# Patient Record
Sex: MALE | Race: WHITE | NOT HISPANIC OR LATINO
[De-identification: names, ages, dates, MRNs, and addresses within clinical notes are randomized per-mention and may not be internally consistent; named-entity substitution may affect disease eponyms.]

---

## 2018-06-21 ENCOUNTER — APPOINTMENT (OUTPATIENT)
Dept: HEART AND VASCULAR | Facility: CLINIC | Age: 63
End: 2018-06-21
Payer: COMMERCIAL

## 2018-06-21 VITALS
BODY MASS INDEX: 27.36 KG/M2 | DIASTOLIC BLOOD PRESSURE: 82 MMHG | HEART RATE: 57 BPM | WEIGHT: 202 LBS | SYSTOLIC BLOOD PRESSURE: 122 MMHG | HEIGHT: 72 IN

## 2018-06-21 DIAGNOSIS — J30.2 OTHER SEASONAL ALLERGIC RHINITIS: ICD-10-CM

## 2018-06-21 DIAGNOSIS — R42 DIZZINESS AND GIDDINESS: ICD-10-CM

## 2018-06-21 DIAGNOSIS — G43.909 MIGRAINE, UNSPECIFIED, NOT INTRACTABLE, W/OUT STATUS MIGRAINOSUS: ICD-10-CM

## 2018-06-21 DIAGNOSIS — K21.9 GASTRO-ESOPHAGEAL REFLUX DISEASE W/OUT ESOPHAGITIS: ICD-10-CM

## 2018-06-21 DIAGNOSIS — R94.31 ABNORMAL ELECTROCARDIOGRAM [ECG] [EKG]: ICD-10-CM

## 2018-06-21 PROCEDURE — 93000 ELECTROCARDIOGRAM COMPLETE: CPT

## 2018-06-21 PROCEDURE — 99205 OFFICE O/P NEW HI 60 MIN: CPT | Mod: 25

## 2018-06-21 PROCEDURE — 36415 COLL VENOUS BLD VENIPUNCTURE: CPT

## 2018-06-25 LAB
24R-OH-CALCIDIOL SERPL-MCNC: 53.1 PG/ML
25(OH)D3 SERPL-MCNC: 35.1 NG/ML
ALBUMIN SERPL ELPH-MCNC: 4.9 G/DL
ALP BLD-CCNC: 59 U/L
ALT SERPL-CCNC: 20 U/L
ANION GAP SERPL CALC-SCNC: 14 MMOL/L
APPEARANCE: CLEAR
AST SERPL-CCNC: 25 U/L
BACTERIA: NEGATIVE
BASOPHILS # BLD AUTO: 0.02 K/UL
BASOPHILS NFR BLD AUTO: 0.5 %
BILIRUB SERPL-MCNC: 0.6 MG/DL
BILIRUBIN URINE: ABNORMAL
BLOOD URINE: NEGATIVE
BUN SERPL-MCNC: 24 MG/DL
CALCIUM SERPL-MCNC: 9.5 MG/DL
CHLORIDE SERPL-SCNC: 110 MMOL/L
CHOLEST SERPL-MCNC: 183 MG/DL
CHOLEST/HDLC SERPL: 2.8 RATIO
CO2 SERPL-SCNC: 23 MMOL/L
COLOR: ABNORMAL
CREAT SERPL-MCNC: 1.41 MG/DL
CREAT SPEC-SCNC: 285 MG/DL
EOSINOPHIL # BLD AUTO: 0.07 K/UL
EOSINOPHIL NFR BLD AUTO: 1.7 %
FERRITIN SERPL-MCNC: 39 NG/ML
FOLATE SERPL-MCNC: 16.3 NG/ML
GLUCOSE QUALITATIVE U: NEGATIVE MG/DL
GLUCOSE SERPL-MCNC: 95 MG/DL
HBA1C MFR BLD HPLC: 5.1 %
HCT VFR BLD CALC: 44.3 %
HDLC SERPL-MCNC: 65 MG/DL
HGB BLD-MCNC: 14.9 G/DL
HYALINE CASTS: 1 /LPF
IMM GRANULOCYTES NFR BLD AUTO: 0 %
IRON SATN MFR SERPL: 27 %
IRON SERPL-MCNC: 106 UG/DL
KETONES URINE: ABNORMAL
LDLC SERPL CALC-MCNC: 105 MG/DL
LEUKOCYTE ESTERASE URINE: NEGATIVE
LYMPHOCYTES # BLD AUTO: 0.93 K/UL
LYMPHOCYTES NFR BLD AUTO: 22.7 %
MAGNESIUM SERPL-MCNC: 2.1 MG/DL
MAN DIFF?: NORMAL
MCHC RBC-ENTMCNC: 31.4 PG
MCHC RBC-ENTMCNC: 33.6 GM/DL
MCV RBC AUTO: 93.3 FL
MICROALBUMIN 24H UR DL<=1MG/L-MCNC: <0.3 MG/DL
MICROALBUMIN/CREAT 24H UR-RTO: NORMAL
MICROSCOPIC-UA: NORMAL
MONOCYTES # BLD AUTO: 0.48 K/UL
MONOCYTES NFR BLD AUTO: 11.7 %
NEUTROPHILS # BLD AUTO: 2.6 K/UL
NEUTROPHILS NFR BLD AUTO: 63.4 %
NITRITE URINE: NEGATIVE
PH URINE: 6.5
PLATELET # BLD AUTO: 187 K/UL
POTASSIUM SERPL-SCNC: 4.6 MMOL/L
PROT SERPL-MCNC: 7.1 G/DL
PROTEIN URINE: 30 MG/DL
RBC # BLD: 4.75 M/UL
RBC # FLD: 13.5 %
RED BLOOD CELLS URINE: 2 /HPF
SODIUM SERPL-SCNC: 147 MMOL/L
SPECIFIC GRAVITY URINE: 1.04
SQUAMOUS EPITHELIAL CELLS: 0 /HPF
T3FREE SERPL-MCNC: 3.2 PG/ML
T4 FREE SERPL-MCNC: 1.7 NG/DL
TIBC SERPL-MCNC: 399 UG/DL
TRIGL SERPL-MCNC: 63 MG/DL
TSH SERPL-ACNC: 1.77 UIU/ML
UIBC SERPL-MCNC: 293 UG/DL
UROBILINOGEN URINE: 1 MG/DL
VIT B12 SERPL-MCNC: 569 PG/ML
WBC # FLD AUTO: 4.1 K/UL
WHITE BLOOD CELLS URINE: 0 /HPF

## 2018-06-25 RX ORDER — SIMVASTATIN 80 MG/1
TABLET, FILM COATED ORAL
Refills: 0 | Status: DISCONTINUED | COMMUNITY
End: 2018-06-25

## 2018-07-26 ENCOUNTER — OUTPATIENT (OUTPATIENT)
Dept: OUTPATIENT SERVICES | Facility: HOSPITAL | Age: 63
LOS: 1 days | End: 2018-07-26
Payer: COMMERCIAL

## 2018-07-26 ENCOUNTER — APPOINTMENT (OUTPATIENT)
Dept: ULTRASOUND IMAGING | Facility: HOSPITAL | Age: 63
End: 2018-07-26
Payer: COMMERCIAL

## 2018-07-26 PROCEDURE — 93016 CV STRESS TEST SUPVJ ONLY: CPT

## 2018-07-26 PROCEDURE — 93880 EXTRACRANIAL BILAT STUDY: CPT

## 2018-07-26 PROCEDURE — 93017 CV STRESS TEST TRACING ONLY: CPT

## 2018-07-26 PROCEDURE — 93880 EXTRACRANIAL BILAT STUDY: CPT | Mod: 26

## 2018-07-26 PROCEDURE — 93306 TTE W/DOPPLER COMPLETE: CPT | Mod: 26

## 2018-07-26 PROCEDURE — 93018 CV STRESS TEST I&R ONLY: CPT

## 2018-07-26 PROCEDURE — 93306 TTE W/DOPPLER COMPLETE: CPT

## 2018-08-09 ENCOUNTER — TRANSCRIPTION ENCOUNTER (OUTPATIENT)
Age: 63
End: 2018-08-09

## 2018-11-15 ENCOUNTER — TRANSCRIPTION ENCOUNTER (OUTPATIENT)
Age: 63
End: 2018-11-15

## 2018-11-15 RX ORDER — LOSARTAN POTASSIUM 100 MG/1
100 TABLET, FILM COATED ORAL
Refills: 0 | Status: DISCONTINUED | COMMUNITY
End: 2018-11-15

## 2018-12-17 ENCOUNTER — APPOINTMENT (OUTPATIENT)
Dept: HEART AND VASCULAR | Facility: CLINIC | Age: 63
End: 2018-12-17
Payer: COMMERCIAL

## 2018-12-17 VITALS
HEART RATE: 61 BPM | WEIGHT: 204 LBS | DIASTOLIC BLOOD PRESSURE: 80 MMHG | SYSTOLIC BLOOD PRESSURE: 122 MMHG | HEIGHT: 72 IN | BODY MASS INDEX: 27.63 KG/M2

## 2018-12-17 PROCEDURE — 99213 OFFICE O/P EST LOW 20 MIN: CPT

## 2018-12-17 NOTE — HISTORY OF PRESENT ILLNESS
[FreeTextEntry1] : 63 y/o male with h/o hypothyroid, htn, hl, migraine, carotid disease who presents for f/up today.\par \par Last seen \par \par swtiched to lisinopril from losartan due to recall in \par switched to lipitor from zocor \par No cp, sob, syncope, edema, palpitation, orthopnea, pnd, lh\par \par After last visit had\par \par : ETT and Echo which were normal\par \par carotid u/s : mild calcific plaque right carotid bifurcation/right prox ICA\par \par Noted to have mild carotid disease 8 years ago\par \par \par On statin since 40's\par \par \par \par Exercises with ellipitical 2 hours - 4-5 times/week\par Eats healthy\par Low stress\par 7 hours sleep\par No mental health issues\par \par Food allergies to milk/soy\par \par \par \par PMH/PSH:\par hypothyroid\par seasonal allergies\par gerd\par htn\par hl\par brissa\par migraine\par alfred neuroma surgery right foot \par uvelectomy for frank \par \par \par \par \par ALL:\par ppi - rash\par \par \par \par SH:\par no tobacco\par 1 glass wine 3-4 times/week\par no drugs\par works as artist/real estate\par from [place]\par , re-\par daughter - 39, Marfan's\par \par \par \par \par MEDS:\par synthroid 75 mcg qd\par fluticasone 50 mcg per nostril\par azelastine 137 mcg 2 sprays/nostril bid\par loratidine 10 mg qd\par ranitidine 150 mg qd\par buproprion 100 mg qd\par lisinopril 20 mg qd\par lamotrigine 100 mg bid\par D3 2000iu qd\par omega 3 1200 bid\par coQ 10 20 mg qd\par asa 81 mg qd\par biotin 5000 mcg qd\par b12 1000 mcg qod\par ferrous sulfate 65 mg qod\par Vit C 500 mg qod\par dhea 25 mg qod\par mg 500 mg \par lipitor 40 mg qhs\par \par \par \par FH:\par mother - cabg 80's,  91\par father - cabg 70's,  93\par brother - 68, alive\par sister - 70, alive, RA, deafness\par EXAM:  ECHOCARDIOGRAM (CARDIOL)                      \par \par PROCEDURE DATE:  2018  \par \par \par \par INTERPRETATION:  Patient Height: 182.9 cm\par Patient Weight: 91.6 kg\par Heart Rate: 54 bpm\par Systolic Pressure: 143 mmHg\par Diastolic Pressure: 72 mmHg\par BSA: 2.1 m^2\par Interpretation Summary\par There is mild concentric left ventricular hypertrophy.The left \par ventricular wall\par  motion is normal.The left ventricular ejection fraction is estimated to \par be\par  55-60%The left atrial size is normal.Right atrial size is normal.The \par right\par  ventricle is normal in size and function.Structurally normal aortic \par valve.No\par  aortic regurgitation noted.There is mild to moderate mitral annular\par  calcification.Mitral valve thickening noted.Structurally normal tricuspid\par  valve.No tricuspid regurgitation noted.No aortic root \par dilatation.Structurally\par  normal pulmonic valve.There is trace to mild pulmonic \par regurgitation.There is\par  no pericardial effusion.\par Procedure Details\par A complete two-dimensional transthoracic echocardiogram was performed (2D,\par M-mode, spectral and color flow doppler).\par Study Quality: Good.\par Left Ventricle\par There is mild concentric left ventricular hypertrophy.\par The left ventricular wall motion is normal.\par The left ventricular ejection fraction is normal.\par The left ventricular ejection fraction is estimated to be 55-60%\par Left Atrium\par The left atrial size is normal.\par Right Atrium\par Right atrial size is normal.\par Right Ventricle\par The right ventricle is normal in size and function.\par Aortic Valve\par Structurally normal aortic valve.\par No aortic regurgitation noted.\par Mitral Valve\par There is mild to moderate mitral annular calcification.\par Mitral valve thickening noted.\par No mitral regurgitation noted.\par Tricuspid Valve\par Structurally normal tricuspid valve.\par No tricuspid regurgitation noted.\par Pulmonic Valve\par Structurally normal pulmonic valve.\par There is trace to mild pulmonic regurgitation.\par Arteries and Venous System\par No aortic root dilatation.\par The inferior vena cava is normal in size (<2.1 cm) with normal inspiratory\par collapse (>50%) consistent with normal right atrial pressure.\par Pericardium / Pleura\par There is no pericardial effusion.\par Doppler Measurements & Calculations\par MV E point: 60.7 cm/sec\par MV A point: 57.8 cm/sec\par MV E/A: 1.1\par Ao V2 mean: 102.5 cm/sec\par Ao mean P.7 mmHg\par Ao mean PG (full): 1.5 mmHg\par Ao V2 VTI: 28.9 cm\par JALYN(I,A): 3.6 cm^2\par JALYN(I,D): 3.6 cm^2\par LV max P.3 mmHg\par LV mean PG: 3.2 mmHg\par LV V1 max: 125.9 cm/sec\par LV V1 mean: 85.1 cm/sec\par LV V1 VTI: 24.6 cm\par SV(Ao): 316.6 ml\par SI(Ao): 148.0 ml/m^2\par SV(LVOT): 104.2 ml\par SI(LVOT): 48.7 ml/m^2\par TR Max viky: 249.8 cm/sec\par MMode 2D Measurements & Calculations\par IVSd: 1.3 cm\par LVIDd: 4.7 cm\par LVIDs: 2.9 cm\par LVPWd: 1.2 cm\par IVS/LVPW: 1.1\par FS: 38.7 %\par EDV(Teich): 104.0 ml\par ESV(Teich): 32.2 ml\par LV mass(C)d: 220.1 grams\par LV mass(C)dI: 102.9 grams/m^2\par SI(cubed): 38.2 ml/m^2\par Ao root diam: 3.7 cm\par Ao root area: 11.0 cm^2\par LA dimension: 4.3 cm\par asc Aorta: 3.5 cm\par LA/Ao: 1.1\par LVOT diam: 2.3 cm\par LVOT area: 4.2 cm^2\par LVOT area (M): 4.2 cm^2\par LVLd ap4: 8.0 cm\par EDV(MOD-sp4): 61 ml\par LVLs ap4: 6.1 cm\par ESV(MOD-sp4): 27 ml\par EF(MOD-sp4): 55.7 %\par LVLd ap2: 7.3 cm\par EDV(MOD-sp2): 59 ml\par LVLs ap2: 6.3 cm\par ESV(MOD-sp2): 24 ml\par EF(MOD-sp2): 59.3 %\par SV(MOD-sp4): 34 ml\par SI(MOD-sp4): 15.9 ml/m^2\par SV(MOD-sp2): 35 ml\par SI(MOD-sp2): 16.4 ml/m^2\par Interpreting Physician:Judy Acosta MD electronically signed on \par 2018 13:17:46\par \par \par \par \par \par \par \par "Thank you for the opportunity to participate in the care of this \par patient."\par \par \par \par JUDY ACOSTA M.D., ATTENDING CARDIOLOGIST\par This document has been electronically signed. 2018  1:17PM\par   \par \par \par EXAM:  EKG STRESS TEST (CARDIOL)                      \par \par PROCEDURE DATE:  2018  \par \par \par \par INTERPRETATION:  EXERCISE STRESS TREADMILL TEST\par \par Indication: Cardiac Risk Assessment. \par \par Exercise Performance:\par The patient exercised for 12 min and 17 secs on a Con protocol achieved \par a peak workload of 13.90 METS. The patient's baseline heart rate was 67 \par bpm and the peak heart rate achieved was 153 bpm which represents 97% of \par the patient's maximal predicted heart rate. The patient's baseline blood \par pressure was 150/80 mm Hg and the peak blood pressure was 190/80 mm Hg. \par The double product was 29,070.\par \par EKG at rest demonstrated normal sinus rhythm with nonspecific ST-T \par changes. At peak exercise there were no significant ST?T?changes noted.  \par \par IMPRESSION: \par \par 1.  Normal exercise stress EKG test. \par 2.  Normal heart rate and blood pressure response.\par 3.         Excellent exercise tolerance. \par \par \par \par \par \par "Thank you for the opportunity to participate in the care of this \par patient."\par \par \par

## 2018-12-17 NOTE — DISCUSSION/SUMMARY
[Patient] : the patient [___ Month(s)] : [unfilled] month(s) [FreeTextEntry1] : \par 63 y/o male with h/o hypothyroid, htn, hl, migraine, carotid disease who presents for f/up today\par \par -ekg 6/18- sinus kb, normal intervals, lateral repolarization changes c/w possible ischemia\par -ETT: 7/18: normal\par -Echo 7/18: normal ef, no significant valvular disease\par -Carotid u/s:  6/18: mild calcific plaque right carotid bifurcation/right prox ICA\par -asa 81 mg qd\par -continue statin - increase to 80 mg qd\par -labs 11/18 reviewed\par -if increasing LH, can consider holter to evaluate bradycardia\par -counseled on CVD risk factors and advised he d/c buproprion if possible given htn s.effect\par -limit supplement use\par -f/up 2-3 months for lipids

## 2019-02-25 ENCOUNTER — APPOINTMENT (OUTPATIENT)
Dept: INTERNAL MEDICINE | Facility: CLINIC | Age: 64
End: 2019-02-25
Payer: MEDICAID

## 2019-02-25 VITALS
WEIGHT: 198 LBS | HEIGHT: 72 IN | DIASTOLIC BLOOD PRESSURE: 71 MMHG | SYSTOLIC BLOOD PRESSURE: 122 MMHG | BODY MASS INDEX: 26.82 KG/M2 | HEART RATE: 68 BPM | OXYGEN SATURATION: 98 % | TEMPERATURE: 98.1 F

## 2019-02-25 DIAGNOSIS — K63.5 POLYP OF COLON: ICD-10-CM

## 2019-02-25 DIAGNOSIS — G43.009 MIGRAINE W/OUT AURA, NOT INTRACTABLE, W/OUT STATUS MIGRAINOSUS: ICD-10-CM

## 2019-02-25 DIAGNOSIS — Z78.9 OTHER SPECIFIED HEALTH STATUS: ICD-10-CM

## 2019-02-25 PROCEDURE — 99386 PREV VISIT NEW AGE 40-64: CPT

## 2019-02-25 RX ORDER — FERROUS SULFATE 325(65) MG
TABLET ORAL
Refills: 0 | Status: DISCONTINUED | COMMUNITY
End: 2019-02-25

## 2019-02-25 RX ORDER — LAMOTRIGINE 100 MG/1
100 TABLET ORAL TWICE DAILY
Refills: 0 | Status: ACTIVE | COMMUNITY

## 2019-02-25 RX ORDER — CHLORHEXIDINE GLUCONATE 4 %
325 (65 FE) LIQUID (ML) TOPICAL DAILY
Qty: 90 | Refills: 3 | Status: ACTIVE | COMMUNITY

## 2019-02-25 RX ORDER — ASCORBIC ACID 500 MG
TABLET ORAL
Refills: 0 | Status: ACTIVE | COMMUNITY

## 2019-02-25 RX ORDER — LEVOTHYROXINE SODIUM 75 UG/1
75 TABLET ORAL DAILY
Qty: 90 | Refills: 3 | Status: ACTIVE | COMMUNITY

## 2019-02-25 RX ORDER — OMEGA-3/DHA/EPA/FISH OIL 300-1000MG
CAPSULE ORAL
Refills: 0 | Status: ACTIVE | COMMUNITY

## 2019-02-25 RX ORDER — CHROMIUM 200 MCG
TABLET ORAL
Refills: 0 | Status: ACTIVE | COMMUNITY

## 2019-02-25 RX ORDER — MELATONIN/PYRIDOXINE HCL (B6) 5 MG-10 MG
TABLET,IMMED, EXTENDED RELEASE, BIPHASIC ORAL
Refills: 0 | Status: DISCONTINUED | COMMUNITY
End: 2019-02-25

## 2019-02-25 NOTE — HISTORY OF PRESENT ILLNESS
[FreeTextEntry1] : annual exam [de-identified] : HLD/CAD\par - follows w/ cardio\par - compliant w/ medications\par \par Depression/anxiety\par - controlled\par - follows w/ psychopharmacologist\par \par Migraines\par - stable /w lamotrigine\par \par HCM\par - h/o colon polyp, due for repeat colonoscopy

## 2019-02-25 NOTE — HEALTH RISK ASSESSMENT
[Excellent] : ~his/her~  mood as  excellent [] : No [No falls in past year] : Patient reported no falls in the past year [0] : 2) Feeling down, depressed, or hopeless: Not at all (0) [[id number]] : 0 [Language] : denies difficulty with language [Behavior] : denies difficulty with behavior [Learning/Retaining New Information] : denies difficulty learning/retaining new information [Handling Complex Tasks] : denies difficulty handling complex tasks [Reasoning] : denies difficulty with reasoning [Spatial Ability and Orientation] : denies difficulty with spatial ability and orientation [None] : None [With Significant Other] : lives with significant other [] :  [Sexually Active] : sexually active [High Risk Behavior] : no high risk behavior [Fully functional (bathing, dressing, toileting, transferring, walking, feeding)] : Fully functional (bathing, dressing, toileting, transferring, walking, feeding) [Fully functional (using the telephone, shopping, preparing meals, housekeeping, doing laundry, using] : Fully functional and needs no help or supervision to perform IADLs (using the telephone, shopping, preparing meals, housekeeping, doing laundry, using transportation, managing medications and managing finances) [Reports changes in hearing] : Reports no changes in hearing [Reports changes in vision] : Reports no changes in vision [Reports normal functional visual acuity (ie: able to read med bottle)] : Reports normal functional visual acuity [Reports changes in dental health] : Reports no changes in dental health [Smoke Detector] : no smoke detector [Safety elements used in home] : no safety elements used in home [With Patient/Caregiver] : With Patient/Caregiver [Designated Healthcare Proxy] : Designated healthcare proxy [AdvancecareDate] : 02/25/2019

## 2019-02-25 NOTE — PHYSICAL EXAM
[No Acute Distress] : no acute distress [Well Nourished] : well nourished [Well Developed] : well developed [Well-Appearing] : well-appearing [Normal Sclera/Conjunctiva] : normal sclera/conjunctiva [EOMI] : extraocular movements intact [Normal Outer Ear/Nose] : the outer ears and nose were normal in appearance [Supple] : supple [No Lymphadenopathy] : no lymphadenopathy [Thyroid Normal, No Nodules] : the thyroid was normal and there were no nodules present [No Respiratory Distress] : no respiratory distress  [Clear to Auscultation] : lungs were clear to auscultation bilaterally [No Accessory Muscle Use] : no accessory muscle use [Normal Rate] : normal rate  [Regular Rhythm] : with a regular rhythm [Normal S1, S2] : normal S1 and S2 [No Murmur] : no murmur heard [No Varicosities] : no varicosities [No Edema] : there was no peripheral edema [No Extremity Clubbing/Cyanosis] : no extremity clubbing/cyanosis [Soft] : abdomen soft [Non Tender] : non-tender [Non-distended] : non-distended [No Masses] : no abdominal mass palpated [No HSM] : no HSM [Normal Supraclavicular Nodes] : no supraclavicular lymphadenopathy [Normal Anterior Cervical Nodes] : no anterior cervical lymphadenopathy [No Joint Swelling] : no joint swelling [No Rash] : no rash [Normal Gait] : normal gait [Coordination Grossly Intact] : coordination grossly intact [No Focal Deficits] : no focal deficits [Normal Affect] : the affect was normal [Alert and Oriented x3] : oriented to person, place, and time [Normal Mood] : the mood was normal [Normal Insight/Judgement] : insight and judgment were intact

## 2019-03-07 ENCOUNTER — APPOINTMENT (OUTPATIENT)
Dept: HEART AND VASCULAR | Facility: CLINIC | Age: 64
End: 2019-03-07
Payer: MEDICAID

## 2019-03-07 VITALS
SYSTOLIC BLOOD PRESSURE: 110 MMHG | WEIGHT: 195 LBS | OXYGEN SATURATION: 98 % | HEART RATE: 68 BPM | DIASTOLIC BLOOD PRESSURE: 82 MMHG | BODY MASS INDEX: 26.41 KG/M2 | HEIGHT: 72 IN

## 2019-03-07 PROCEDURE — 36415 COLL VENOUS BLD VENIPUNCTURE: CPT

## 2019-03-07 PROCEDURE — 99213 OFFICE O/P EST LOW 20 MIN: CPT | Mod: 25

## 2019-03-07 NOTE — HISTORY OF PRESENT ILLNESS
[FreeTextEntry1] : 64 y/o male with h/o hypothyroid, htn, hl, migraine, carotid disease who presents for f/up today.\par \par Last seen . Lipitor increased then to 80 mg qhs\par \par Doing intermittent fasting and has lost 10 lbs\par \par \par No cp, sob, syncope, edema, palpitation, orthopnea, pnd, lh\par \par \par \par : ETT and Echo which were normal\par \par carotid u/s : mild calcific plaque right carotid bifurcation/right prox ICA\par \par Noted to have mild carotid disease 8 years ago\par \par \par On statin since 40s\par \par \par \par Exercises with ellipitical 2 hours - 4-5 times/week\par Eats healthy\par Low stress\par 7 hours sleep\par No mental health issues\par \par Food allergies to milk/soy\par \par \par \par PMH/PSH:\par hypothyroid\par seasonal allergies\par gerd\par htn\par hl\par brissa\par migraine\par alfred neuroma surgery right foot \par uvelectomy for frank \par \par \par \par \par ALL:\par ppi - rash\par \par \par \par SH:\par no tobacco\par 1 glass wine 3-4 times/week\par no drugs\par works as artist/real estate\par from [place]\par , re-\par daughter - 39, Marfan's\par \par \par \par \par MEDS:\par synthroid 75 mcg qd\par fluticasone 50 mcg per nostril\par azelastine 137 mcg 2 sprays/nostril bid\par loratidine 10 mg qd\par ranitidine 150 mg qd\par buproprion 100 mg qd\par lisinopril 20 mg qd\par lamotrigine 100 mg bid\par D3 2000iu qd\par omega 3 1200 bid\par coQ 10 20 mg qd\par asa 81 mg qd\par biotin 5000 mcg qd\par b12 1000 mcg qod\par ferrous sulfate 65 mg qod\par Vit C 500 mg qod\par dhea 25 mg qod\par mg 500 mg \par lipitor 80 mg qhs\par \par \par \par FH:\par mother - cabg 80's,  91\par father - cabg 70's,  93\par brother - 68, alive\par sister - 70, alive, RA, deafness\par EXAM: ECHOCARDIOGRAM (CARDIOL)  \par \par PROCEDURE DATE: 2018 \par \par \par \par INTERPRETATION: Patient Height: 182.9 cm\par Patient Weight: 91.6 kg\par Heart Rate: 54 bpm\par Systolic Pressure: 143 mmHg\par Diastolic Pressure: 72 mmHg\par BSA: 2.1 m^2\par Interpretation Summary\par There is mild concentric left ventricular hypertrophy.The left \par ventricular wall\par  motion is normal.The left ventricular ejection fraction is estimated to \par be\par  55-60%The left atrial size is normal.Right atrial size is normal.The \par right\par  ventricle is normal in size and function.Structurally normal aortic \par valve.No\par  aortic regurgitation noted.There is mild to moderate mitral annular\par  calcification.Mitral valve thickening noted.Structurally normal tricuspid\par  valve.No tricuspid regurgitation noted.No aortic root \par dilatation.Structurally\par  normal pulmonic valve.There is trace to mild pulmonic \par regurgitation.There is\par  no pericardial effusion.\par Procedure Details\par A complete two-dimensional transthoracic echocardiogram was performed (2D,\par M-mode, spectral and color flow doppler).\par Study Quality: Good.\par Left Ventricle\par There is mild concentric left ventricular hypertrophy.\par The left ventricular wall motion is normal.\par The left ventricular ejection fraction is normal.\par The left ventricular ejection fraction is estimated to be 55-60%\par Left Atrium\par The left atrial size is normal.\par Right Atrium\par Right atrial size is normal.\par Right Ventricle\par The right ventricle is normal in size and function.\par Aortic Valve\par Structurally normal aortic valve.\par No aortic regurgitation noted.\par Mitral Valve\par There is mild to moderate mitral annular calcification.\par Mitral valve thickening noted.\par No mitral regurgitation noted.\par Tricuspid Valve\par Structurally normal tricuspid valve.\par No tricuspid regurgitation noted.\par Pulmonic Valve\par Structurally normal pulmonic valve.\par There is trace to mild pulmonic regurgitation.\par Arteries and Venous System\par No aortic root dilatation.\par The inferior vena cava is normal in size (<2.1 cm) with normal inspiratory\par collapse (>50%) consistent with normal right atrial pressure.\par Pericardium / Pleura\par There is no pericardial effusion.\par Doppler Measurements & Calculations\par MV E point: 60.7 cm/sec\par MV A point: 57.8 cm/sec\par MV E/A: 1.1\par Ao V2 mean: 102.5 cm/sec\par Ao mean P.7 mmHg\par Ao mean PG (full): 1.5 mmHg\par Ao V2 VTI: 28.9 cm\par JALYN(I,A): 3.6 cm^2\par JALYN(I,D): 3.6 cm^2\par LV max P.3 mmHg\par LV mean PG: 3.2 mmHg\par LV V1 max: 125.9 cm/sec\par LV V1 mean: 85.1 cm/sec\par LV V1 VTI: 24.6 cm\par SV(Ao): 316.6 ml\par SI(Ao): 148.0 ml/m^2\par SV(LVOT): 104.2 ml\par SI(LVOT): 48.7 ml/m^2\par TR Max viky: 249.8 cm/sec\par MMode 2D Measurements & Calculations\par IVSd: 1.3 cm\par LVIDd: 4.7 cm\par LVIDs: 2.9 cm\par LVPWd: 1.2 cm\par IVS/LVPW: 1.1\par FS: 38.7 %\par EDV(Teich): 104.0 ml\par ESV(Teich): 32.2 ml\par LV mass(C)d: 220.1 grams\par LV mass(C)dI: 102.9 grams/m^2\par SI(cubed): 38.2 ml/m^2\par Ao root diam: 3.7 cm\par Ao root area: 11.0 cm^2\par LA dimension: 4.3 cm\par asc Aorta: 3.5 cm\par LA/Ao: 1.1\par LVOT diam: 2.3 cm\par LVOT area: 4.2 cm^2\par LVOT area (M): 4.2 cm^2\par LVLd ap4: 8.0 cm\par EDV(MOD-sp4): 61 ml\par LVLs ap4: 6.1 cm\par ESV(MOD-sp4): 27 ml\par EF(MOD-sp4): 55.7 %\par LVLd ap2: 7.3 cm\par EDV(MOD-sp2): 59 ml\par LVLs ap2: 6.3 cm\par ESV(MOD-sp2): 24 ml\par EF(MOD-sp2): 59.3 %\par SV(MOD-sp4): 34 ml\par SI(MOD-sp4): 15.9 ml/m^2\par SV(MOD-sp2): 35 ml\par SI(MOD-sp2): 16.4 ml/m^2\par Interpreting Physician:Judy Acosta MD electronically signed on \par 2018 13:17:46\par \par \par \par \par \par \par \par "Thank you for the opportunity to participate in the care of this \par patient."\par \par \par \par JUDY ACOSTA M.D., ATTENDING CARDIOLOGIST\par This document has been electronically signed. 2018 1:17PM\par  \par \par \par EXAM: EKG STRESS TEST (CARDIOL)  \par \par PROCEDURE DATE: 2018 \par \par \par \par INTERPRETATION: EXERCISE STRESS TREADMILL TEST\par \par Indication: Cardiac Risk Assessment. \par \par Exercise Performance:\par The patient exercised for 12 min and 17 secs on a Con protocol achieved \par a peak workload of 13.90 METS. The patient's baseline heart rate was 67 \par bpm and the peak heart rate achieved was 153 bpm which represents 97% of \par the patient's maximal predicted heart rate. The patient's baseline blood \par pressure was 150/80 mm Hg and the peak blood pressure was 190/80 mm Hg. \par The double product was 29,070.\par \par EKG at rest demonstrated normal sinus rhythm with nonspecific ST-T \par changes. At peak exercise there were no significant ST?T?changes noted. \par \par IMPRESSION: \par \par 1. Normal exercise stress EKG test. \par 2. Normal heart rate and blood pressure response.\par 3. Excellent exercise tolerance. \par \par \par \par \par \par "Thank you for the opportunity to participate in the care of this \par patient."\par \par

## 2019-03-07 NOTE — DISCUSSION/SUMMARY
[Patient] : the patient [___ Month(s)] : [unfilled] month(s) [FreeTextEntry1] : \par 64 y/o male with h/o hypothyroid, htn, hl, migraine, carotid disease who presents for f/up today\par \par -ekg 6/18- sinus kb, normal intervals, lateral repolarization changes c/w possible ischemia\par -ETT: 7/18: normal\par -Echo 7/18: normal ef, no significant valvular disease\par -Carotid u/s: 6/18: mild calcific plaque right carotid bifurcation/right prox ICA\par -asa 81 mg qd\par -continue statin\par -labs 11/18 reviewed\par -if increasing LH, can consider holter to evaluate bradycardia\par -counseled on CVD risk factors and advised he d/c buproprion if possible given htn s.effect\par -limit supplement use\par -ordered lipids today \par -f/up 6 months\par

## 2019-03-09 ENCOUNTER — TRANSCRIPTION ENCOUNTER (OUTPATIENT)
Age: 64
End: 2019-03-09

## 2019-03-11 LAB
CHOLEST SERPL-MCNC: 147 MG/DL
CHOLEST/HDLC SERPL: 2.9 RATIO
HDLC SERPL-MCNC: 50 MG/DL
LDLC SERPL CALC-MCNC: 87 MG/DL
TRIGL SERPL-MCNC: 52 MG/DL

## 2019-07-22 ENCOUNTER — TRANSCRIPTION ENCOUNTER (OUTPATIENT)
Age: 64
End: 2019-07-22

## 2019-07-22 ENCOUNTER — RX RENEWAL (OUTPATIENT)
Age: 64
End: 2019-07-22

## 2019-08-05 ENCOUNTER — APPOINTMENT (OUTPATIENT)
Dept: GASTROENTEROLOGY | Facility: CLINIC | Age: 64
End: 2019-08-05

## 2019-08-12 ENCOUNTER — TRANSCRIPTION ENCOUNTER (OUTPATIENT)
Age: 64
End: 2019-08-12

## 2019-08-12 ENCOUNTER — RX RENEWAL (OUTPATIENT)
Age: 64
End: 2019-08-12

## 2019-08-23 ENCOUNTER — APPOINTMENT (OUTPATIENT)
Dept: GASTROENTEROLOGY | Facility: CLINIC | Age: 64
End: 2019-08-23
Payer: MEDICARE

## 2019-08-23 ENCOUNTER — RESULT REVIEW (OUTPATIENT)
Age: 64
End: 2019-08-23

## 2019-08-23 PROCEDURE — 45380 COLONOSCOPY AND BIOPSY: CPT | Mod: PT

## 2019-09-05 ENCOUNTER — NON-APPOINTMENT (OUTPATIENT)
Age: 64
End: 2019-09-05

## 2019-09-05 ENCOUNTER — APPOINTMENT (OUTPATIENT)
Dept: HEART AND VASCULAR | Facility: CLINIC | Age: 64
End: 2019-09-05
Payer: MEDICARE

## 2019-09-05 VITALS
HEIGHT: 72 IN | BODY MASS INDEX: 26.82 KG/M2 | SYSTOLIC BLOOD PRESSURE: 122 MMHG | HEART RATE: 54 BPM | DIASTOLIC BLOOD PRESSURE: 80 MMHG | WEIGHT: 198 LBS

## 2019-09-05 PROCEDURE — 99214 OFFICE O/P EST MOD 30 MIN: CPT | Mod: 25

## 2019-09-05 PROCEDURE — 93000 ELECTROCARDIOGRAM COMPLETE: CPT

## 2019-09-05 NOTE — HISTORY OF PRESENT ILLNESS
[FreeTextEntry1] : 65 y/o male with h/o hypothyroid, htn, hl, migraine, carotid disease who presents for f/up today.\par \par Last seen 3/19\par \par \par No cp, sob, syncope, edema, palpitation, orthopnea, pnd, lh\par \par \par \par : ETT and Echo which were normal\par \par carotid u/s : mild calcific plaque right carotid bifurcation/right prox ICA\par \par Noted to have mild carotid disease 8 years ago\par \par \par On statin since 40's\par \par \par \par Exercises with ellipitical 2 hours - 4-5 times/week\par Eats healthy\par Low stress\par 7 hours sleep\par No mental health issues\par \par Food allergies to milk/soy\par \par \par \par PMH/PSH:\par hypothyroid\par seasonal allergies\par gerd\par htn\par hl\par brissa\par migraine\par alfred neuroma surgery right foot \par uvelectomy for frank \par \par \par \par \par ALL:\par ppi - rash\par \par \par \par SH:\par no tobacco\par 1 glass wine 3-4 times/week\par no drugs\par works as artist/real estate\par from [place]\par , re-\par daughter - 39, Marfan's\par \par \par \par \par MEDS:\par synthroid 75 mcg qd\par fluticasone 50 mcg per nostril\par azelastine 137 mcg 2 sprays/nostril bid\par loratidine 10 mg qd\par ranitidine 150 mg qd\par buproprion 100 mg qd\par lisinopril 20 mg qd\par lamotrigine 100 mg bid\par D3 2000iu qd\par omega 3 1200 bid\par coQ 10 20 mg qd\par asa 81 mg qd\par biotin 5000 mcg qd\par b12 1000 mcg qod\par ferrous sulfate 65 mg qod\par Vit C 500 mg qod\par dhea 25 mg qod\par mg 500 mg \par lipitor 80 mg qhs\par \par \par \par FH:\par mother - cabg 80's,  91\par father - cabg 70's,  93\par brother - 68, alive\par sister - 70, alive, RA, deafness\par EXAM: ECHOCARDIOGRAM (CARDIOL) \par \par PROCEDURE DATE: 2018 \par \par \par \par INTERPRETATION: Patient Height: 182.9 cm\par Patient Weight: 91.6 kg\par Heart Rate: 54 bpm\par Systolic Pressure: 143 mmHg\par Diastolic Pressure: 72 mmHg\par BSA: 2.1 m^2\par Interpretation Summary\par There is mild concentric left ventricular hypertrophy.The left \par ventricular wall\par  motion is normal.The left ventricular ejection fraction is estimated to \par be\par  55-60%The left atrial size is normal.Right atrial size is normal.The \par right\par  ventricle is normal in size and function.Structurally normal aortic \par valve.No\par  aortic regurgitation noted.There is mild to moderate mitral annular\par  calcification.Mitral valve thickening noted.Structurally normal tricuspid\par  valve.No tricuspid regurgitation noted.No aortic root \par dilatation.Structurally\par  normal pulmonic valve.There is trace to mild pulmonic \par regurgitation.There is\par  no pericardial effusion.\par Procedure Details\par A complete two-dimensional transthoracic echocardiogram was performed (2D,\par M-mode, spectral and color flow doppler).\par Study Quality: Good.\par Left Ventricle\par There is mild concentric left ventricular hypertrophy.\par The left ventricular wall motion is normal.\par The left ventricular ejection fraction is normal.\par The left ventricular ejection fraction is estimated to be 55-60%\par Left Atrium\par The left atrial size is normal.\par Right Atrium\par Right atrial size is normal.\par Right Ventricle\par The right ventricle is normal in size and function.\par Aortic Valve\par Structurally normal aortic valve.\par No aortic regurgitation noted.\par Mitral Valve\par There is mild to moderate mitral annular calcification.\par Mitral valve thickening noted.\par No mitral regurgitation noted.\par Tricuspid Valve\par Structurally normal tricuspid valve.\par No tricuspid regurgitation noted.\par Pulmonic Valve\par Structurally normal pulmonic valve.\par There is trace to mild pulmonic regurgitation.\par Arteries and Venous System\par No aortic root dilatation.\par The inferior vena cava is normal in size (<2.1 cm) with normal inspiratory\par collapse (>50%) consistent with normal right atrial pressure.\par Pericardium / Pleura\par There is no pericardial effusion.\par Doppler Measurements & Calculations\par MV E point: 60.7 cm/sec\par MV A point: 57.8 cm/sec\par MV E/A: 1.1\par Ao V2 mean: 102.5 cm/sec\par Ao mean P.7 mmHg\par Ao mean PG (full): 1.5 mmHg\par Ao V2 VTI: 28.9 cm\par JALYN(I,A): 3.6 cm^2\par JALYN(I,D): 3.6 cm^2\par LV max P.3 mmHg\par LV mean PG: 3.2 mmHg\par LV V1 max: 125.9 cm/sec\par LV V1 mean: 85.1 cm/sec\par LV V1 VTI: 24.6 cm\par SV(Ao): 316.6 ml\par SI(Ao): 148.0 ml/m^2\par SV(LVOT): 104.2 ml\par SI(LVOT): 48.7 ml/m^2\par TR Max viky: 249.8 cm/sec\par MMode 2D Measurements & Calculations\par IVSd: 1.3 cm\par LVIDd: 4.7 cm\par LVIDs: 2.9 cm\par LVPWd: 1.2 cm\par IVS/LVPW: 1.1\par FS: 38.7 %\par EDV(Teich): 104.0 ml\par ESV(Teich): 32.2 ml\par LV mass(C)d: 220.1 grams\par LV mass(C)dI: 102.9 grams/m^2\par SI(cubed): 38.2 ml/m^2\par Ao root diam: 3.7 cm\par Ao root area: 11.0 cm^2\par LA dimension: 4.3 cm\par asc Aorta: 3.5 cm\par LA/Ao: 1.1\par LVOT diam: 2.3 cm\par LVOT area: 4.2 cm^2\par LVOT area (M): 4.2 cm^2\par LVLd ap4: 8.0 cm\par EDV(MOD-sp4): 61 ml\par LVLs ap4: 6.1 cm\par ESV(MOD-sp4): 27 ml\par EF(MOD-sp4): 55.7 %\par LVLd ap2: 7.3 cm\par EDV(MOD-sp2): 59 ml\par LVLs ap2: 6.3 cm\par ESV(MOD-sp2): 24 ml\par EF(MOD-sp2): 59.3 %\par SV(MOD-sp4): 34 ml\par SI(MOD-sp4): 15.9 ml/m^2\par SV(MOD-sp2): 35 ml\par SI(MOD-sp2): 16.4 ml/m^2\par Interpreting Physician:Judy Acosta MD electronically signed on \par 2018 13:17:46\par \par \par \par \par \par \par \par "Thank you for the opportunity to participate in the care of this \par patient."\par \par \par \par JUDY ACOSTA M.D., ATTENDING CARDIOLOGIST\par This document has been electronically signed. 2018 1:17PM\par  \par \par \par EXAM: EKG STRESS TEST (CARDIOL) \par \par PROCEDURE DATE: 2018 \par \par \par \par INTERPRETATION: EXERCISE STRESS TREADMILL TEST\par \par Indication: Cardiac Risk Assessment. \par \par Exercise Performance:\par The patient exercised for 12 min and 17 secs on a Con protocol achieved \par a peak workload of 13.90 METS. The patient's baseline heart rate was 67 \par bpm and the peak heart rate achieved was 153 bpm which represents 97% of \par the patient's maximal predicted heart rate. The patient's baseline blood \par pressure was 150/80 mm Hg and the peak blood pressure was 190/80 mm Hg. \par The double product was 29,070.\par \par EKG at rest demonstrated normal sinus rhythm with nonspecific ST-T \par changes. At peak exercise there were no significant ST?T?changes noted. \par \par IMPRESSION: \par \par 1. Normal exercise stress EKG test. \par 2. Normal heart rate and blood pressure response.\par 3. Excellent exercise tolerance. \par \par \par \par \par \par "Thank you for the opportunity to participate in the care of this \par patient."\par \par

## 2019-09-05 NOTE — DISCUSSION/SUMMARY
[Patient] : the patient [___ Month(s)] : [unfilled] month(s) [FreeTextEntry1] : 65 y/o male with h/o hypothyroid, htn, hl, migraine, carotid disease who presents for f/up today\par \par -ordered ekg today - SB, rvcd, nsra\par -ekg 6/18- sinus kb, normal intervals, lateral repolarization changes c/w possible ischemia\par -ETT: 7/18: normal\par -Echo 7/18: normal ef, no significant valvular disease\par -Carotid u/s: 6/18: mild calcific plaque right carotid bifurcation/right prox ICA\par -asa 81 mg qd\par -continue statin\par -labs 11/18 reviewed\par -counseled on CVD risk factors and advised he d/c buproprion if possible given htn s.effect\par -limit supplement use\par -f/up 4 months for labs

## 2019-10-02 ENCOUNTER — MEDICATION RENEWAL (OUTPATIENT)
Age: 64
End: 2019-10-02

## 2020-01-13 ENCOUNTER — APPOINTMENT (OUTPATIENT)
Dept: HEART AND VASCULAR | Facility: CLINIC | Age: 65
End: 2020-01-13
Payer: MEDICARE

## 2020-01-13 VITALS
WEIGHT: 198 LBS | BODY MASS INDEX: 26.82 KG/M2 | HEART RATE: 60 BPM | HEIGHT: 72 IN | SYSTOLIC BLOOD PRESSURE: 126 MMHG | DIASTOLIC BLOOD PRESSURE: 76 MMHG

## 2020-01-13 PROCEDURE — 82043 UR ALBUMIN QUANTITATIVE: CPT | Mod: QW

## 2020-01-13 PROCEDURE — 99214 OFFICE O/P EST MOD 30 MIN: CPT | Mod: 25

## 2020-01-13 PROCEDURE — 36415 COLL VENOUS BLD VENIPUNCTURE: CPT

## 2020-01-13 PROCEDURE — 81005 URINALYSIS: CPT

## 2020-01-13 NOTE — HISTORY OF PRESENT ILLNESS
[FreeTextEntry1] : 63 y/o male with h/o hypothyroid, htn, hl, migraine, carotid disease who presents for f/up today.\par \par Last seen \par \par \par No cp, sob, syncope, edema, palpitation, orthopnea, pnd, lh\par \par \par : ETT and Echo which were normal\par \par carotid u/s : mild calcific plaque right carotid bifurcation/right prox ICA\par \par Noted to have mild carotid disease 8 years ago\par \par \par On statin since 40's\par \par \par \par Exercises with ellipitical 2 hours - 4-5 times/week\par Eats healthy\par Low stress\par 7 hours sleep\par No mental health issues\par \par Food allergies to milk/soy\par \par \par \par PMH/PSH:\par hypothyroid\par seasonal allergies\par gerd\par htn\par hl\par brissa\par migraine\par alfred neuroma surgery right foot \par uvelectomy for frank \par \par \par \par \par ALL:\par ppi - rash\par \par \par \par SH:\par no tobacco\par 1 glass wine 3-4 times/week\par no drugs\par works as artist/real estate\par from [place]\par , re-\par daughter - 39, Marfan's\par \par \par \par \par MEDS:\par synthroid 75 mcg qd\par fluticasone 50 mcg per nostril\par azelastine 137 mcg 2 sprays/nostril bid\par loratidine 10 mg qd\par ranitidine 150 mg qd\par buproprion 100 mg qd\par lisinopril 20 mg qd\par lamotrigine 100 mg bid\par D3 2000iu qd\par omega 3 1200 bid\par asa 81 mg qd\par biotin 5000 mcg qd\par dhea 25 mg qod\par mg 500 mg \par lipitor 80 mg qhs\par \par \par \par FH:\par mother - cabg 80's,  91\par father - cabg 70's,  93\par brother - 68, alive\par sister - 70, alive, RA, deafness\par EXAM: ECHOCARDIOGRAM (CARDIOL) \par \par PROCEDURE DATE: 2018 \par \par \par \par INTERPRETATION: Patient Height: 182.9 cm\par Patient Weight: 91.6 kg\par Heart Rate: 54 bpm\par Systolic Pressure: 143 mmHg\par Diastolic Pressure: 72 mmHg\par BSA: 2.1 m^2\par Interpretation Summary\par There is mild concentric left ventricular hypertrophy.The left \par ventricular wall\par  motion is normal.The left ventricular ejection fraction is estimated to \par be\par  55-60%The left atrial size is normal.Right atrial size is normal.The \par right\par  ventricle is normal in size and function.Structurally normal aortic \par valve.No\par  aortic regurgitation noted.There is mild to moderate mitral annular\par  calcification.Mitral valve thickening noted.Structurally normal tricuspid\par  valve.No tricuspid regurgitation noted.No aortic root \par dilatation.Structurally\par  normal pulmonic valve.There is trace to mild pulmonic \par regurgitation.There is\par  no pericardial effusion.\par Procedure Details\par A complete two-dimensional transthoracic echocardiogram was performed (2D,\par M-mode, spectral and color flow doppler).\par Study Quality: Good.\par Left Ventricle\par There is mild concentric left ventricular hypertrophy.\par The left ventricular wall motion is normal.\par The left ventricular ejection fraction is normal.\par The left ventricular ejection fraction is estimated to be 55-60%\par Left Atrium\par The left atrial size is normal.\par Right Atrium\par Right atrial size is normal.\par Right Ventricle\par The right ventricle is normal in size and function.\par Aortic Valve\par Structurally normal aortic valve.\par No aortic regurgitation noted.\par Mitral Valve\par There is mild to moderate mitral annular calcification.\par Mitral valve thickening noted.\par No mitral regurgitation noted.\par Tricuspid Valve\par Structurally normal tricuspid valve.\par No tricuspid regurgitation noted.\par Pulmonic Valve\par Structurally normal pulmonic valve.\par There is trace to mild pulmonic regurgitation.\par Arteries and Venous System\par No aortic root dilatation.\par The inferior vena cava is normal in size (<2.1 cm) with normal inspiratory\par collapse (>50%) consistent with normal right atrial pressure.\par Pericardium / Pleura\par There is no pericardial effusion.\par Doppler Measurements & Calculations\par MV E point: 60.7 cm/sec\par MV A point: 57.8 cm/sec\par MV E/A: 1.1\par Ao V2 mean: 102.5 cm/sec\par Ao mean P.7 mmHg\par Ao mean PG (full): 1.5 mmHg\par Ao V2 VTI: 28.9 cm\par JALYN(I,A): 3.6 cm^2\par JALYN(I,D): 3.6 cm^2\par LV max P.3 mmHg\par LV mean PG: 3.2 mmHg\par LV V1 max: 125.9 cm/sec\par LV V1 mean: 85.1 cm/sec\par LV V1 VTI: 24.6 cm\par SV(Ao): 316.6 ml\par SI(Ao): 148.0 ml/m^2\par SV(LVOT): 104.2 ml\par SI(LVOT): 48.7 ml/m^2\par TR Max viky: 249.8 cm/sec\par MMode 2D Measurements & Calculations\par IVSd: 1.3 cm\par LVIDd: 4.7 cm\par LVIDs: 2.9 cm\par LVPWd: 1.2 cm\par IVS/LVPW: 1.1\par FS: 38.7 %\par EDV(Teich): 104.0 ml\par ESV(Teich): 32.2 ml\par LV mass(C)d: 220.1 grams\par LV mass(C)dI: 102.9 grams/m^2\par SI(cubed): 38.2 ml/m^2\par Ao root diam: 3.7 cm\par Ao root area: 11.0 cm^2\par LA dimension: 4.3 cm\par asc Aorta: 3.5 cm\par LA/Ao: 1.1\par LVOT diam: 2.3 cm\par LVOT area: 4.2 cm^2\par LVOT area (M): 4.2 cm^2\par LVLd ap4: 8.0 cm\par EDV(MOD-sp4): 61 ml\par LVLs ap4: 6.1 cm\par ESV(MOD-sp4): 27 ml\par EF(MOD-sp4): 55.7 %\par LVLd ap2: 7.3 cm\par EDV(MOD-sp2): 59 ml\par LVLs ap2: 6.3 cm\par ESV(MOD-sp2): 24 ml\par EF(MOD-sp2): 59.3 %\par SV(MOD-sp4): 34 ml\par SI(MOD-sp4): 15.9 ml/m^2\par SV(MOD-sp2): 35 ml\par SI(MOD-sp2): 16.4 ml/m^2\par Interpreting Physician:Judy Acosta MD electronically signed on \par 2018 13:17:46\par \par \par \par \par \par \par \par "Thank you for the opportunity to participate in the care of this \par patient."\par \par \par \par JUDY ACOSTA M.D., ATTENDING CARDIOLOGIST\par This document has been electronically signed. 2018 1:17PM\par  \par \par \par EXAM: EKG STRESS TEST (CARDIOL) \par \par PROCEDURE DATE: 2018 \par \par \par \par INTERPRETATION: EXERCISE STRESS TREADMILL TEST\par \par Indication: Cardiac Risk Assessment. \par \par Exercise Performance:\par The patient exercised for 12 min and 17 secs on a Con protocol achieved \par a peak workload of 13.90 METS. The patient's baseline heart rate was 67 \par bpm and the peak heart rate achieved was 153 bpm which represents 97% of \par the patient's maximal predicted heart rate. The patient's baseline blood \par pressure was 150/80 mm Hg and the peak blood pressure was 190/80 mm Hg. \par The double product was 29,070.\par \par EKG at rest demonstrated normal sinus rhythm with nonspecific ST-T \par changes. At peak exercise there were no significant ST?T?changes noted. \par \par IMPRESSION: \par \par 1. Normal exercise stress EKG test. \par 2. Normal heart rate and blood pressure response.\par 3. Excellent exercise tolerance. \par \par \par \par \par \par "Thank you for the opportunity to participate in the care of this \par patient."\par \par

## 2020-01-13 NOTE — PHYSICAL EXAM
Patient states that he feels like there is swelling under his feet. If he takes indomethacin at night before bed, the sensation resolves.   [General Appearance - Well Developed] : well developed [General Appearance - Well Nourished] : well nourished [General Appearance - In No Acute Distress] : no acute distress

## 2020-01-13 NOTE — DISCUSSION/SUMMARY
[Patient] : the patient [___ Month(s)] : [unfilled] month(s) [FreeTextEntry1] : 63 y/o male with h/o hypothyroid, htn, hl, migraine, carotid disease who presents for f/up today\par \par -ekg 9/19 - SB, rvcd, nsra\par -ekg 6/18- sinus kb, normal intervals, lateral repolarization changes c/w possible ischemia\par -ETT: 7/18: normal\par -Echo 7/18: normal ef, no significant valvular disease\par -Carotid u/s: 6/18: mild calcific plaque right carotid bifurcation/right prox ICA\par -asa 81 mg qd\par -continue statin\par -labs 11/18 reviewed, ordered labs today\par -counseled on CVD risk factors and advised he d/c buproprion if possible given htn s.effect\par -limit supplement use\par -f/up 6 months\par \par

## 2020-01-15 LAB
ALBUMIN SERPL ELPH-MCNC: 4.7 G/DL
ALP BLD-CCNC: 58 U/L
ALT SERPL-CCNC: 17 U/L
ANION GAP SERPL CALC-SCNC: 10 MMOL/L
APPEARANCE: CLEAR
AST SERPL-CCNC: 22 U/L
BACTERIA: NEGATIVE
BASOPHILS # BLD AUTO: 0.03 K/UL
BASOPHILS NFR BLD AUTO: 0.6 %
BILIRUB SERPL-MCNC: 0.5 MG/DL
BILIRUBIN URINE: NEGATIVE
BLOOD URINE: NEGATIVE
BUN SERPL-MCNC: 18 MG/DL
CALCIUM SERPL-MCNC: 9.6 MG/DL
CHLORIDE SERPL-SCNC: 109 MMOL/L
CHOLEST SERPL-MCNC: 145 MG/DL
CHOLEST/HDLC SERPL: 2.5 RATIO
CO2 SERPL-SCNC: 25 MMOL/L
COLOR: YELLOW
CREAT SERPL-MCNC: 1.12 MG/DL
CREAT SPEC-SCNC: 235 MG/DL
EOSINOPHIL # BLD AUTO: 0.08 K/UL
EOSINOPHIL NFR BLD AUTO: 1.5 %
ESTIMATED AVERAGE GLUCOSE: 105 MG/DL
GLUCOSE QUALITATIVE U: NEGATIVE
GLUCOSE SERPL-MCNC: 89 MG/DL
HBA1C MFR BLD HPLC: 5.3 %
HCT VFR BLD CALC: 40.1 %
HDLC SERPL-MCNC: 59 MG/DL
HGB BLD-MCNC: 12.4 G/DL
HYALINE CASTS: 0 /LPF
IMM GRANULOCYTES NFR BLD AUTO: 0.2 %
KETONES URINE: NEGATIVE
LDLC SERPL CALC-MCNC: 75 MG/DL
LEUKOCYTE ESTERASE URINE: NEGATIVE
LYMPHOCYTES # BLD AUTO: 1.34 K/UL
LYMPHOCYTES NFR BLD AUTO: 25.7 %
MAGNESIUM SERPL-MCNC: 2.2 MG/DL
MAN DIFF?: NORMAL
MCHC RBC-ENTMCNC: 29.5 PG
MCHC RBC-ENTMCNC: 30.9 GM/DL
MCV RBC AUTO: 95.2 FL
MICROALBUMIN 24H UR DL<=1MG/L-MCNC: <1.2 MG/DL
MICROALBUMIN/CREAT 24H UR-RTO: NORMAL MG/G
MICROSCOPIC-UA: NORMAL
MONOCYTES # BLD AUTO: 0.59 K/UL
MONOCYTES NFR BLD AUTO: 11.3 %
NEUTROPHILS # BLD AUTO: 3.16 K/UL
NEUTROPHILS NFR BLD AUTO: 60.7 %
NITRITE URINE: NEGATIVE
PH URINE: 6
PLATELET # BLD AUTO: 248 K/UL
POTASSIUM SERPL-SCNC: 4.8 MMOL/L
PROT SERPL-MCNC: 6.6 G/DL
PROTEIN URINE: NORMAL
RBC # BLD: 4.21 M/UL
RBC # FLD: 13.2 %
RED BLOOD CELLS URINE: 0 /HPF
SODIUM SERPL-SCNC: 144 MMOL/L
SPECIFIC GRAVITY URINE: 1.03
SQUAMOUS EPITHELIAL CELLS: 0 /HPF
T3FREE SERPL-MCNC: 2.97 PG/ML
T4 FREE SERPL-MCNC: 1.6 NG/DL
TRIGL SERPL-MCNC: 56 MG/DL
TSH SERPL-ACNC: 1.83 UIU/ML
UROBILINOGEN URINE: ABNORMAL
WBC # FLD AUTO: 5.21 K/UL
WHITE BLOOD CELLS URINE: 1 /HPF

## 2020-01-22 ENCOUNTER — TRANSCRIPTION ENCOUNTER (OUTPATIENT)
Age: 65
End: 2020-01-22

## 2020-01-23 ENCOUNTER — TRANSCRIPTION ENCOUNTER (OUTPATIENT)
Age: 65
End: 2020-01-23

## 2020-02-20 ENCOUNTER — RX RENEWAL (OUTPATIENT)
Age: 65
End: 2020-02-20

## 2020-02-25 ENCOUNTER — LABORATORY RESULT (OUTPATIENT)
Age: 65
End: 2020-02-25

## 2020-02-25 ENCOUNTER — APPOINTMENT (OUTPATIENT)
Dept: GASTROENTEROLOGY | Facility: CLINIC | Age: 65
End: 2020-02-25
Payer: MEDICARE

## 2020-02-25 VITALS
HEIGHT: 72 IN | RESPIRATION RATE: 14 BRPM | TEMPERATURE: 98.6 F | SYSTOLIC BLOOD PRESSURE: 125 MMHG | BODY MASS INDEX: 27.22 KG/M2 | DIASTOLIC BLOOD PRESSURE: 80 MMHG | HEART RATE: 64 BPM | WEIGHT: 201 LBS | OXYGEN SATURATION: 97 %

## 2020-02-25 PROCEDURE — 99204 OFFICE O/P NEW MOD 45 MIN: CPT | Mod: 25

## 2020-02-25 PROCEDURE — 99215 OFFICE O/P EST HI 40 MIN: CPT

## 2020-02-25 PROCEDURE — 36415 COLL VENOUS BLD VENIPUNCTURE: CPT

## 2020-02-25 NOTE — PHYSICAL EXAM
[General Appearance - Alert] : alert [General Appearance - In No Acute Distress] : in no acute distress [PERRL With Normal Accommodation] : pupils were equal in size, round, and reactive to light [Extraocular Movements] : extraocular movements were intact [Neck Appearance] : the appearance of the neck was normal [Exaggerated Use Of Accessory Muscles For Inspiration] : no accessory muscle use [Heart Rate And Rhythm] : heart rate was normal and rhythm regular [Heart Sounds] : normal S1 and S2 [Bowel Sounds] : normal bowel sounds [Abdomen Soft] : soft [Abdomen Tenderness] : non-tender [Abnormal Walk] : normal gait [Musculoskeletal - Swelling] : no joint swelling seen [Skin Turgor] : normal skin turgor [] : no rash [Cranial Nerves] : cranial nerves 2-12 were intact [Deep Tendon Reflexes (DTR)] : deep tendon reflexes were 2+ and symmetric [Oriented To Time, Place, And Person] : oriented to person, place, and time [Impaired Insight] : insight and judgment were intact

## 2020-02-26 NOTE — HISTORY OF PRESENT ILLNESS
[de-identified] : 64M w/ PMHx of HTN, HLD, GERD, NEDA p/f evaluation of anemia. \par Patient with recent blood test found to have drop of hgb 12 form 14.9 the prior year.  He had discontinued iron supplement last year when check during blood donation was unremarkable.  Patient with prior NEDA around 2013 with EGD/Colon but no capsule endoscopy.  Anemia improved after evaluation by hematologist, and had iron transfusion.\par GERD controlled with pepcid in the morning, one tab.  \par Denies feve,r chill, cp, sob, abd pain, nausea, vomiting, dysphagia, odynophagia, unexplained wt loss, early satiety, diarrhea, constipation.\par BM twice a day, Pershing type 3.  No melena or hematochezia. \par Intermittent fasting, eats from 1-8 pm\par \par EGD 2013 (for abd cramp): Erythematous duodenopathy, gastritis w/ hemorrhage,  nodular mucosa in the gastric fundus sp biopsy hiatal hernia, arytenoid erythema.  \par Colonoscopy 2014 for NEDA: mod colonic spasm, melanosis in the colon sp biopsy, diverticulosis in the sigmoid colon and in the descending colon, one 7 mm polyp in the ascending colon sp polypectomy, internal hemorrhoid\par \par Never smoke, glass of wine 3x/w, denies drug use\par Denies fam hx of colon ca, father with prostate ca

## 2020-02-26 NOTE — ASSESSMENT
[FreeTextEntry1] : 64M w/ PMHx of HTN, HLD, GERD, NEDA p/f evaluation of anemia.\par \par #Iron Deficiency Anemia\par Patient with prior endoscopic evaluation for anemia without significant finding.  He discontinue iron 5 months ago and now found to have anemia from 14.9 to 12.4.  He denies overt GI bleeding and no NSAID or alarm features.  Denies wt loss, early satiety or fatigue.  Plan to evaluate change in hgb after iron repletion to determine role of endoscopic evaluation.\par -Recheck CBC and iron studies\par -Will plan for endoscopic evaluation if patient with persistent anemia\par \par #HCM\par Last Colonoscopy 2014 with one 7 mm hyperplastic polyp.\par -Repeat screening in 2024\par \par Aguila Antony MD\par Gastroenterology Fellow

## 2020-03-12 ENCOUNTER — TRANSCRIPTION ENCOUNTER (OUTPATIENT)
Age: 65
End: 2020-03-12

## 2020-07-20 ENCOUNTER — NON-APPOINTMENT (OUTPATIENT)
Age: 65
End: 2020-07-20

## 2020-07-20 ENCOUNTER — APPOINTMENT (OUTPATIENT)
Dept: HEART AND VASCULAR | Facility: CLINIC | Age: 65
End: 2020-07-20
Payer: MEDICARE

## 2020-07-20 VITALS
HEIGHT: 72 IN | WEIGHT: 199 LBS | SYSTOLIC BLOOD PRESSURE: 132 MMHG | HEART RATE: 51 BPM | BODY MASS INDEX: 26.95 KG/M2 | TEMPERATURE: 98.5 F | DIASTOLIC BLOOD PRESSURE: 82 MMHG

## 2020-07-20 PROCEDURE — 93000 ELECTROCARDIOGRAM COMPLETE: CPT

## 2020-07-20 PROCEDURE — 99214 OFFICE O/P EST MOD 30 MIN: CPT | Mod: 25

## 2020-07-20 NOTE — DISCUSSION/SUMMARY
[Patient] : the patient [___ Month(s)] : [unfilled] month(s) [FreeTextEntry1] : 63 y/o male with h/o hypothyroid, htn, hl, migraine, carotid disease who presents for f/up today\par \par -repeat carotid u/s yearly - ordered today\par -ekg ordered today - SB, rvcd, nsra\par -ETT: 7/18: normal\par -Echo 7/18: normal ef, no significant valvular disease\par -Carotid u/s: 6/18: mild calcific plaque right carotid bifurcation/right prox ICA\par -asa 81 mg qd\par -continue statin\par -labs 2020 reviewed\par -counseled on CVD risk factors and advised he d/c buproprion if possible given htn s.effect\par -limit supplement use\par -f/up 6 months\par

## 2020-07-20 NOTE — HISTORY OF PRESENT ILLNESS
[FreeTextEntry1] : 65 y/o male with h/o hypothyroid, htn, hl, migraine, carotid disease who presents for f/up today.\par \par Last seen \par \par \par No cp, sob, syncope, edema, palpitation, orthopnea, pnd\par notes some positional LH\par \par \par : ETT and Echo which were normal\par \par carotid u/s : mild calcific plaque right carotid bifurcation/right prox ICA\par \par Noted to have mild carotid disease 8 years ago\par \par \par On statin since 40's\par \par \par \par Exercises with ellipitical 2 hours - 4-5 times/week\par Eats healthy\par Low stress\par 7 hours sleep\par No mental health issues\par \par Food allergies to milk/soy\par \par \par \par PMH/PSH:\par hypothyroid\par seasonal allergies\par gerd\par htn\par hl\par brissa\par migraine\par alfred neuroma surgery right foot \par uvelectomy for frank \par \par \par \par \par ALL:\par ppi - rash\par \par \par \par SH:\par no tobacco\par 1 glass wine 3-4 times/week\par no drugs\par works as artist/real estate\par from [place]\par , re-\par daughter - 39, Marfan's\par \par \par \par \par MEDS:\par synthroid 75 mcg qd\par fluticasone 50 mcg per nostril\par azelastine 137 mcg 2 sprays/nostril bid\par loratidine 10 mg qd\par ranitidine 150 mg qd\par buproprion 100 mg qd\par lisinopril 20 mg qd\par lamotrigine 100 mg bid\par D3 2000iu qd\par omega 3 1200 bid\par asa 81 mg qd\par biotin 5000 mcg qd\par dhea 25 mg qod\par mg 500 mg \par lipitor 80 mg qhs\par \par \par \par FH:\par mother - cabg 80's,  91\par father - cabg 70's,  93\par brother - 68, alive\par sister - 70, alive, RA, deafness\par \par \par EXAM: ECHOCARDIOGRAM (CARDIOL) \par \par PROCEDURE DATE: 2018 \par \par \par \par INTERPRETATION: Patient Height: 182.9 cm\par Patient Weight: 91.6 kg\par Heart Rate: 54 bpm\par Systolic Pressure: 143 mmHg\par Diastolic Pressure: 72 mmHg\par BSA: 2.1 m^2\par Interpretation Summary\par There is mild concentric left ventricular hypertrophy.The left \par ventricular wall\par  motion is normal.The left ventricular ejection fraction is estimated to \par be\par  55-60%The left atrial size is normal.Right atrial size is normal.The \par right\par  ventricle is normal in size and function.Structurally normal aortic \par valve.No\par  aortic regurgitation noted.There is mild to moderate mitral annular\par  calcification.Mitral valve thickening noted.Structurally normal tricuspid\par  valve.No tricuspid regurgitation noted.No aortic root \par dilatation.Structurally\par  normal pulmonic valve.There is trace to mild pulmonic \par regurgitation.There is\par  no pericardial effusion.\par Procedure Details\par A complete two-dimensional transthoracic echocardiogram was performed (2D,\par M-mode, spectral and color flow doppler).\par Study Quality: Good.\par Left Ventricle\par There is mild concentric left ventricular hypertrophy.\par The left ventricular wall motion is normal.\par The left ventricular ejection fraction is normal.\par The left ventricular ejection fraction is estimated to be 55-60%\par Left Atrium\par The left atrial size is normal.\par Right Atrium\par Right atrial size is normal.\par Right Ventricle\par The right ventricle is normal in size and function.\par Aortic Valve\par Structurally normal aortic valve.\par No aortic regurgitation noted.\par Mitral Valve\par There is mild to moderate mitral annular calcification.\par Mitral valve thickening noted.\par No mitral regurgitation noted.\par Tricuspid Valve\par Structurally normal tricuspid valve.\par No tricuspid regurgitation noted.\par Pulmonic Valve\par Structurally normal pulmonic valve.\par There is trace to mild pulmonic regurgitation.\par Arteries and Venous System\par No aortic root dilatation.\par The inferior vena cava is normal in size (<2.1 cm) with normal inspiratory\par collapse (>50%) consistent with normal right atrial pressure.\par Pericardium / Pleura\par There is no pericardial effusion.\par Doppler Measurements & Calculations\par MV E point: 60.7 cm/sec\par MV A point: 57.8 cm/sec\par MV E/A: 1.1\par Ao V2 mean: 102.5 cm/sec\par Ao mean P.7 mmHg\par Ao mean PG (full): 1.5 mmHg\par Ao V2 VTI: 28.9 cm\par JALYN(I,A): 3.6 cm^2\par JALYN(I,D): 3.6 cm^2\par LV max P.3 mmHg\par LV mean PG: 3.2 mmHg\par LV V1 max: 125.9 cm/sec\par LV V1 mean: 85.1 cm/sec\par LV V1 VTI: 24.6 cm\par SV(Ao): 316.6 ml\par SI(Ao): 148.0 ml/m^2\par SV(LVOT): 104.2 ml\par SI(LVOT): 48.7 ml/m^2\par TR Max viky: 249.8 cm/sec\par MMode 2D Measurements & Calculations\par IVSd: 1.3 cm\par LVIDd: 4.7 cm\par LVIDs: 2.9 cm\par LVPWd: 1.2 cm\par IVS/LVPW: 1.1\par FS: 38.7 %\par EDV(Teich): 104.0 ml\par ESV(Teich): 32.2 ml\par LV mass(C)d: 220.1 grams\par LV mass(C)dI: 102.9 grams/m^2\par SI(cubed): 38.2 ml/m^2\par Ao root diam: 3.7 cm\par Ao root area: 11.0 cm^2\par LA dimension: 4.3 cm\par asc Aorta: 3.5 cm\par LA/Ao: 1.1\par LVOT diam: 2.3 cm\par LVOT area: 4.2 cm^2\par LVOT area (M): 4.2 cm^2\par LVLd ap4: 8.0 cm\par EDV(MOD-sp4): 61 ml\par LVLs ap4: 6.1 cm\par ESV(MOD-sp4): 27 ml\par EF(MOD-sp4): 55.7 %\par LVLd ap2: 7.3 cm\par EDV(MOD-sp2): 59 ml\par LVLs ap2: 6.3 cm\par ESV(MOD-sp2): 24 ml\par EF(MOD-sp2): 59.3 %\par SV(MOD-sp4): 34 ml\par SI(MOD-sp4): 15.9 ml/m^2\par SV(MOD-sp2): 35 ml\par SI(MOD-sp2): 16.4 ml/m^2\par Interpreting Physician:Judy Acosta MD electronically signed on \par 2018 13:17:46\par \par \par \par \par \par \par \par "Thank you for the opportunity to participate in the care of this \par patient."\par \par \par \par JUDY ACOSTA M.D., ATTENDING CARDIOLOGIST\par This document has been electronically signed. 2018 1:17PM\par  \par \par \par EXAM: EKG STRESS TEST (CARDIOL) \par \par PROCEDURE DATE: 2018 \par \par \par \par INTERPRETATION: EXERCISE STRESS TREADMILL TEST\par \par Indication: Cardiac Risk Assessment. \par \par Exercise Performance:\par The patient exercised for 12 min and 17 secs on a Con protocol achieved \par a peak workload of 13.90 METS. The patient's baseline heart rate was 67 \par bpm and the peak heart rate achieved was 153 bpm which represents 97% of \par the patient's maximal predicted heart rate. The patient's baseline blood \par pressure was 150/80 mm Hg and the peak blood pressure was 190/80 mm Hg. \par The double product was 29,070.\par \par EKG at rest demonstrated normal sinus rhythm with nonspecific ST-T \par changes. At peak exercise there were no significant ST?T?changes noted. \par \par IMPRESSION: \par \par 1. Normal exercise stress EKG test. \par 2. Normal heart rate and blood pressure response.\par 3. Excellent exercise tolerance. \par \par \par \par \par \par "Thank you for the opportunity to participate in the care of this \par patient."\par \par \par  \par

## 2020-08-09 ENCOUNTER — OUTPATIENT (OUTPATIENT)
Dept: OUTPATIENT SERVICES | Facility: HOSPITAL | Age: 65
LOS: 1 days | End: 2020-08-09
Payer: MEDICARE

## 2020-08-09 ENCOUNTER — APPOINTMENT (OUTPATIENT)
Dept: ULTRASOUND IMAGING | Facility: HOSPITAL | Age: 65
End: 2020-08-09

## 2020-08-09 ENCOUNTER — RESULT REVIEW (OUTPATIENT)
Age: 65
End: 2020-08-09

## 2020-08-09 PROCEDURE — 93880 EXTRACRANIAL BILAT STUDY: CPT | Mod: 26

## 2020-08-09 PROCEDURE — 93880 EXTRACRANIAL BILAT STUDY: CPT

## 2020-09-01 ENCOUNTER — TRANSCRIPTION ENCOUNTER (OUTPATIENT)
Age: 65
End: 2020-09-01

## 2020-09-01 ENCOUNTER — RX RENEWAL (OUTPATIENT)
Age: 65
End: 2020-09-01

## 2020-09-21 ENCOUNTER — TRANSCRIPTION ENCOUNTER (OUTPATIENT)
Age: 65
End: 2020-09-21

## 2020-10-15 ENCOUNTER — TRANSCRIPTION ENCOUNTER (OUTPATIENT)
Age: 65
End: 2020-10-15

## 2020-10-15 ENCOUNTER — RX RENEWAL (OUTPATIENT)
Age: 65
End: 2020-10-15

## 2020-11-05 ENCOUNTER — APPOINTMENT (OUTPATIENT)
Dept: HEART AND VASCULAR | Facility: CLINIC | Age: 65
End: 2020-11-05
Payer: MEDICARE

## 2020-11-05 VITALS
DIASTOLIC BLOOD PRESSURE: 80 MMHG | WEIGHT: 193 LBS | SYSTOLIC BLOOD PRESSURE: 142 MMHG | BODY MASS INDEX: 26.14 KG/M2 | HEART RATE: 61 BPM | HEIGHT: 72 IN

## 2020-11-05 VITALS — TEMPERATURE: 97.1 F

## 2020-11-05 PROCEDURE — 36415 COLL VENOUS BLD VENIPUNCTURE: CPT

## 2020-11-05 PROCEDURE — 99214 OFFICE O/P EST MOD 30 MIN: CPT | Mod: 25

## 2020-11-05 NOTE — HISTORY OF PRESENT ILLNESS
[FreeTextEntry1] : 64 y/o male with h/o hypothyroid, htn, hl, migraine, carotid disease who presents for f/up today.\par \par Last seen \par \par BP's noted to be higher than usual, increased lisinopril to 40 daily 10/15/20\par \par carotid : small plaque both carotid bulbs R>L, no HD significant stenosis\par \par No cp, sob, syncope, edema, palpitation, orthopnea, pnd\par \par \par \par : ETT and Echo which were normal\par \par carotid u/s : mild calcific plaque right carotid bifurcation/right prox ICA\par \par \par \par On statin since \par \par \par \par Exercises with ellipitical 2 hours - 4-5 times/week\par Eats healthy\par Low stress\par 7 hours sleep\par No mental health issues\par \par Food allergies to milk/soy\par \par \par \par PMH/PSH:\par hypothyroid\par seasonal allergies\par gerd\par htn\par hl\par brissa\par migraine\par alfred neuroma surgery right foot \par uvelectomy for frank \par \par \par \par \par ALL:\par ppi - rash\par \par \par \par SH:\par no tobacco\par 1 glass wine 3-4 times/week\par no drugs\par works as artist/real estate\par from [place]\par , re-\par daughter - 39, Marfan's\par \par \par \par \par MEDS:\par synthroid 75 mcg qd\par fluticasone 50 mcg per nostril\par azelastine 137 mcg 2 sprays/nostril bid\par loratidine 10 mg qd\par ranitidine 150 mg qd\par buproprion 100 mg qd\par lisinopril 40 mg qd\par lamotrigine 100 mg bid\par D3 2000iu qd\par omega 3 1200 bid\par asa 81 mg qd\par biotin 5000 mcg qd\par dhea 25 mg qod\par mg 500 mg \par lipitor 80 mg qhs\par \par \par \par FH:\par mother - cabg 80's,  91\par father - cabg 70's,  93\par brother - 68, alive\par sister - 70, alive, RA, deafness\par \par \par EXAM: ECHOCARDIOGRAM (CARDIOL) \par \par PROCEDURE DATE: 2018 \par \par \par \par INTERPRETATION: Patient Height: 182.9 cm\par Patient Weight: 91.6 kg\par Heart Rate: 54 bpm\par Systolic Pressure: 143 mmHg\par Diastolic Pressure: 72 mmHg\par BSA: 2.1 m^2\par Interpretation Summary\par There is mild concentric left ventricular hypertrophy.The left \par ventricular wall\par  motion is normal.The left ventricular ejection fraction is estimated to \par be\par  55-60%The left atrial size is normal.Right atrial size is normal.The \par right\par  ventricle is normal in size and function.Structurally normal aortic \par valve.No\par  aortic regurgitation noted.There is mild to moderate mitral annular\par  calcification.Mitral valve thickening noted.Structurally normal tricuspid\par  valve.No tricuspid regurgitation noted.No aortic root \par dilatation.Structurally\par  normal pulmonic valve.There is trace to mild pulmonic \par regurgitation.There is\par  no pericardial effusion.\par Procedure Details\par A complete two-dimensional transthoracic echocardiogram was performed (2D,\par M-mode, spectral and color flow doppler).\par Study Quality: Good.\par Left Ventricle\par There is mild concentric left ventricular hypertrophy.\par The left ventricular wall motion is normal.\par The left ventricular ejection fraction is normal.\par The left ventricular ejection fraction is estimated to be 55-60%\par Left Atrium\par The left atrial size is normal.\par Right Atrium\par Right atrial size is normal.\par Right Ventricle\par The right ventricle is normal in size and function.\par Aortic Valve\par Structurally normal aortic valve.\par No aortic regurgitation noted.\par Mitral Valve\par There is mild to moderate mitral annular calcification.\par Mitral valve thickening noted.\par No mitral regurgitation noted.\par Tricuspid Valve\par Structurally normal tricuspid valve.\par No tricuspid regurgitation noted.\par Pulmonic Valve\par Structurally normal pulmonic valve.\par There is trace to mild pulmonic regurgitation.\par Arteries and Venous System\par No aortic root dilatation.\par The inferior vena cava is normal in size (<2.1 cm) with normal inspiratory\par collapse (>50%) consistent with normal right atrial pressure.\par Pericardium / Pleura\par There is no pericardial effusion.\par Doppler Measurements & Calculations\par MV E point: 60.7 cm/sec\par MV A point: 57.8 cm/sec\par MV E/A: 1.1\par Ao V2 mean: 102.5 cm/sec\par Ao mean P.7 mmHg\par Ao mean PG (full): 1.5 mmHg\par Ao V2 VTI: 28.9 cm\par JALYN(I,A): 3.6 cm^2\par JALYN(I,D): 3.6 cm^2\par LV max P.3 mmHg\par LV mean PG: 3.2 mmHg\par LV V1 max: 125.9 cm/sec\par LV V1 mean: 85.1 cm/sec\par LV V1 VTI: 24.6 cm\par SV(Ao): 316.6 ml\par SI(Ao): 148.0 ml/m^2\par SV(LVOT): 104.2 ml\par SI(LVOT): 48.7 ml/m^2\par TR Max viky: 249.8 cm/sec\par MMode 2D Measurements & Calculations\par IVSd: 1.3 cm\par LVIDd: 4.7 cm\par LVIDs: 2.9 cm\par LVPWd: 1.2 cm\par IVS/LVPW: 1.1\par FS: 38.7 %\par EDV(Teich): 104.0 ml\par ESV(Teich): 32.2 ml\par LV mass(C)d: 220.1 grams\par LV mass(C)dI: 102.9 grams/m^2\par SI(cubed): 38.2 ml/m^2\par Ao root diam: 3.7 cm\par Ao root area: 11.0 cm^2\par LA dimension: 4.3 cm\par asc Aorta: 3.5 cm\par LA/Ao: 1.1\par LVOT diam: 2.3 cm\par LVOT area: 4.2 cm^2\par LVOT area (M): 4.2 cm^2\par LVLd ap4: 8.0 cm\par EDV(MOD-sp4): 61 ml\par LVLs ap4: 6.1 cm\par ESV(MOD-sp4): 27 ml\par EF(MOD-sp4): 55.7 %\par LVLd ap2: 7.3 cm\par EDV(MOD-sp2): 59 ml\par LVLs ap2: 6.3 cm\par ESV(MOD-sp2): 24 ml\par EF(MOD-sp2): 59.3 %\par SV(MOD-sp4): 34 ml\par SI(MOD-sp4): 15.9 ml/m^2\par SV(MOD-sp2): 35 ml\par SI(MOD-sp2): 16.4 ml/m^2\par Interpreting Physician:Judy Acosta MD electronically signed on \par 2018 13:17:46\par \par \par \par \par \par \par \par "Thank you for the opportunity to participate in the care of this \par patient."\par \par \par \par JUDY ACOSTA M.D., ATTENDING CARDIOLOGIST\par This document has been electronically signed. 2018 1:17PM\par  \par \par \par EXAM: EKG STRESS TEST (CARDIOL) \par \par PROCEDURE DATE: 2018 \par \par \par \par INTERPRETATION: EXERCISE STRESS TREADMILL TEST\par \par Indication: Cardiac Risk Assessment. \par \par Exercise Performance:\par The patient exercised for 12 min and 17 secs on a Con protocol achieved \par a peak workload of 13.90 METS. The patient's baseline heart rate was 67 \par bpm and the peak heart rate achieved was 153 bpm which represents 97% of \par the patient's maximal predicted heart rate. The patient's baseline blood \par pressure was 150/80 mm Hg and the peak blood pressure was 190/80 mm Hg. \par The double product was 29,070.\par \par EKG at rest demonstrated normal sinus rhythm with nonspecific ST-T \par changes. At peak exercise there were no significant ST?T?changes noted. \par \par IMPRESSION: \par \par 1. Normal exercise stress EKG test. \par 2. Normal heart rate and blood pressure response.\par 3. Excellent exercise tolerance. \par \par \par \par \par \par "Thank you for the opportunity to participate in the care of this \par patient."\par

## 2020-11-05 NOTE — DISCUSSION/SUMMARY
[Patient] : the patient [___ Week(s)] : [unfilled] week(s) [FreeTextEntry1] : 66 y/o male with h/o hypothyroid, htn, hl, migraine, carotid disease who presents for f/up today\par \par -repeat carotid u/s yearly - carotid 8/20: small plaque both carotid bulbs R>L, no HD significant stenosis\par -ekg 7/20 - SB, rvcd, nsra\par -ETT: 7/18: normal\par -Echo 7/18: normal ef, no significant valvular disease\par -asa 81 mg qd\par -continue statin\par -continue ACE, add norvasc 5 mg qd\par -labs 2020 reviewed, ordered labs today\par -counseled on CVD risk factors and advised he d/c buproprion if possible given htn s.effect\par -limit supplement use\par -f/up 2-3 weeks for bp\par

## 2020-11-06 LAB
ALBUMIN SERPL ELPH-MCNC: 4.8 G/DL
ALP BLD-CCNC: 80 U/L
ALT SERPL-CCNC: 34 U/L
ANION GAP SERPL CALC-SCNC: 12 MMOL/L
APPEARANCE: ABNORMAL
AST SERPL-CCNC: 55 U/L
BACTERIA: NEGATIVE
BASOPHILS # BLD AUTO: 0.04 K/UL
BASOPHILS NFR BLD AUTO: 0.6 %
BILIRUB SERPL-MCNC: 0.5 MG/DL
BILIRUBIN URINE: NEGATIVE
BLOOD URINE: NEGATIVE
BUN SERPL-MCNC: 22 MG/DL
CALCIUM SERPL-MCNC: 9.9 MG/DL
CHLORIDE SERPL-SCNC: 107 MMOL/L
CHOLEST SERPL-MCNC: 171 MG/DL
CO2 SERPL-SCNC: 25 MMOL/L
COLOR: YELLOW
CREAT SERPL-MCNC: 1.15 MG/DL
CREAT SPEC-SCNC: 257 MG/DL
EOSINOPHIL # BLD AUTO: 0.07 K/UL
EOSINOPHIL NFR BLD AUTO: 1.1 %
ESTIMATED AVERAGE GLUCOSE: 100 MG/DL
GLUCOSE QUALITATIVE U: NEGATIVE
GLUCOSE SERPL-MCNC: 98 MG/DL
HBA1C MFR BLD HPLC: 5.1 %
HCT VFR BLD CALC: 47.7 %
HDLC SERPL-MCNC: 57 MG/DL
HGB BLD-MCNC: 15.4 G/DL
HYALINE CASTS: 2 /LPF
IMM GRANULOCYTES NFR BLD AUTO: 0.2 %
KETONES URINE: NORMAL
LDLC SERPL CALC-MCNC: 88 MG/DL
LEUKOCYTE ESTERASE URINE: NEGATIVE
LYMPHOCYTES # BLD AUTO: 0.96 K/UL
LYMPHOCYTES NFR BLD AUTO: 14.5 %
MAGNESIUM SERPL-MCNC: 2.1 MG/DL
MAN DIFF?: NORMAL
MCHC RBC-ENTMCNC: 32 PG
MCHC RBC-ENTMCNC: 32.3 GM/DL
MCV RBC AUTO: 99 FL
MICROALBUMIN 24H UR DL<=1MG/L-MCNC: <1.2 MG/DL
MICROALBUMIN/CREAT 24H UR-RTO: NORMAL MG/G
MICROSCOPIC-UA: NORMAL
MONOCYTES # BLD AUTO: 0.65 K/UL
MONOCYTES NFR BLD AUTO: 9.8 %
NEUTROPHILS # BLD AUTO: 4.88 K/UL
NEUTROPHILS NFR BLD AUTO: 73.8 %
NITRITE URINE: NEGATIVE
NONHDLC SERPL-MCNC: 114 MG/DL
PH URINE: 5.5
PLATELET # BLD AUTO: 208 K/UL
POTASSIUM SERPL-SCNC: 4.4 MMOL/L
PROT SERPL-MCNC: 6.7 G/DL
PROTEIN URINE: NORMAL
RBC # BLD: 4.82 M/UL
RBC # FLD: 12.7 %
RED BLOOD CELLS URINE: 1 /HPF
SARS-COV-2 IGG SERPL IA-ACNC: 0.09 INDEX
SARS-COV-2 IGG SERPL QL IA: NEGATIVE
SODIUM SERPL-SCNC: 143 MMOL/L
SPECIFIC GRAVITY URINE: 1.04
SQUAMOUS EPITHELIAL CELLS: 0 /HPF
T3FREE SERPL-MCNC: 2.67 PG/ML
T4 FREE SERPL-MCNC: 1.5 NG/DL
TRIGL SERPL-MCNC: 131 MG/DL
TSH SERPL-ACNC: 1.19 UIU/ML
UROBILINOGEN URINE: ABNORMAL
WBC # FLD AUTO: 6.61 K/UL
WHITE BLOOD CELLS URINE: 0 /HPF

## 2020-11-09 ENCOUNTER — TRANSCRIPTION ENCOUNTER (OUTPATIENT)
Age: 65
End: 2020-11-09

## 2020-11-23 ENCOUNTER — APPOINTMENT (OUTPATIENT)
Dept: HEART AND VASCULAR | Facility: CLINIC | Age: 65
End: 2020-11-23
Payer: MEDICARE

## 2020-11-23 VITALS
WEIGHT: 190 LBS | HEIGHT: 77 IN | BODY MASS INDEX: 22.43 KG/M2 | DIASTOLIC BLOOD PRESSURE: 76 MMHG | SYSTOLIC BLOOD PRESSURE: 118 MMHG | TEMPERATURE: 97.8 F

## 2020-11-23 PROCEDURE — 99213 OFFICE O/P EST LOW 20 MIN: CPT

## 2020-11-23 NOTE — HISTORY OF PRESENT ILLNESS
[FreeTextEntry1] : 66 y/o male with h/o hypothyroid, htn, hl, migraine, carotid disease who presents for f/up today.\par \par Last seen  - norvasc 5 mg added\par \par BP's improved on norvasc, notes some mild LH with change in position\par increased lisinopril to 40 daily 10/15/20\par \par carotid : small plaque both carotid bulbs R>L, no HD significant stenosis\par \par No cp, sob, syncope, edema, palpitation, orthopnea, pnd\par \par \par \par : ETT and Echo which were normal\par \par carotid u/s : mild calcific plaque right carotid bifurcation/right prox ICA\par \par \par \par On statin since \par \par \par \par Exercises with ellipitical 2 hours - 4-5 times/week\par Eats healthy\par Low stress\par 7 hours sleep\par No mental health issues\par \par Food allergies to milk/soy\par \par \par \par PMH/PSH:\par hypothyroid\par seasonal allergies\par gerd\par htn\par hl\par brissa\par migraine\par alfred neuroma surgery right foot \par uvelectomy for frank \par \par \par \par \par ALL:\par ppi - rash\par \par \par \par SH:\par no tobacco\par 1 glass wine 3-4 times/week\par no drugs\par 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 \par EXAM: ECHOCARDIOGRAM (CARDIOL) \par \par PROCEDURE DATE: 2018 \par \par \par \par INTERPRETATION: Patient Height: 182.9 cm\par Patient Weight: 91.6 kg\par Heart Rate: 54 bpm\par Systolic Pressure: 143 mmHg\par Diastolic Pressure: 72 mmHg\par BSA: 2.1 m^2\par Interpretation Summary\par There is mild concentric left ventricular hypertrophy.The left \par ventricular wall\par  motion is normal.The left ventricular ejection fraction is estimated to \par be\par  55-60%The left atrial size is normal.Right atrial size is normal.The \par right\par  ventricle is normal in size and function.Structurally normal aortic \par valve.No\par  aortic regurgitation noted.There is mild to moderate mitral annular\par  calcification.Mitral valve thickening noted.Structurally normal tricuspid\par  valve.No tricuspid regurgitation noted.No aortic root \par dilatation.Structurally\par  normal pulmonic valve.There is trace to mild pulmonic \par regurgitation.There is\par  no pericardial effusion.\par Procedure Details\par A complete two-dimensional transthoracic echocardiogram was performed (2D,\par M-mode, spectral and color flow doppler).\par Study Quality: Good.\par Left Ventricle\par There is mild concentric left ventricular hypertrophy.\par The left ventricular wall motion is normal.\par The left ventricular ejection fraction is normal.\par The left ventricular ejection fraction is estimated to be 55-60%\par Left Atrium\par The left atrial size is normal.\par Right Atrium\par Right atrial size is normal.\par Right Ventricle\par The right ventricle is normal in size and function.\par Aortic Valve\par Structurally normal aortic valve.\par No aortic regurgitation noted.\par Mitral Valve\par There is mild to moderate mitral annular calcification.\par Mitral valve thickening noted.\par No mitral regurgitation noted.\par Tricuspid Valve\par Structurally normal tricuspid valve.\par No tricuspid regurgitation noted.\par Pulmonic Valve\par Structurally normal pulmonic valve.\par There is trace to mild pulmonic regurgitation.\par Arteries and Venous System\par No aortic root dilatation.\par The inferior vena cava is normal in size (<2.1 cm) with normal inspiratory\par collapse (>50%) consistent with normal right atrial pressure.\par Pericardium / Pleura\par There is no pericardial effusion.\par Doppler Measurements & Calculations\par MV E point: 60.7 cm/sec\par MV A point: 57.8 cm/sec\par MV E/A: 1.1\par Ao V2 mean: 102.5 cm/sec\par Ao mean P.7 mmHg\par Ao mean PG (full): 1.5 mmHg\par Ao V2 VTI: 28.9 cm\par JALYN(I,A): 3.6 cm^2\par JALYN(I,D): 3.6 cm^2\par LV max P.3 mmHg\par LV mean PG: 3.2 mmHg\par LV V1 max: 125.9 cm/sec\par LV V1 mean: 85.1 cm/sec\par LV V1 VTI: 24.6 cm\par SV(Ao): 316.6 ml\par SI(Ao): 148.0 ml/m^2\par SV(LVOT): 104.2 ml\par SI(LVOT): 48.7 ml/m^2\par TR Max viky: 249.8 cm/sec\par MMode 2D Measurements & Calculations\par IVSd: 1.3 cm\par LVIDd: 4.7 cm\par LVIDs: 2.9 cm\par LVPWd: 1.2 cm\par IVS/LVPW: 1.1\par FS: 38.7 %\par EDV(Teich): 104.0 ml\par ESV(Teich): 32.2 ml\par LV mass(C)d: 220.1 grams\par LV mass(C)dI: 102.9 grams/m^2\par SI(cubed): 38.2 ml/m^2\par Ao root diam: 3.7 cm\par Ao root area: 11.0 cm^2\par LA dimension: 4.3 cm\par asc Aorta: 3.5 cm\par LA/Ao: 1.1\par LVOT diam: 2.3 cm\par LVOT area: 4.2 cm^2\par LVOT area (M): 4.2 cm^2\par LVLd ap4: 8.0 cm\par EDV(MOD-sp4): 61 ml\par LVLs ap4: 6.1 cm\par ESV(MOD-sp4): 27 ml\par EF(MOD-sp4): 55.7 %\par LVLd ap2: 7.3 cm\par EDV(MOD-sp2): 59 ml\par LVLs ap2: 6.3 cm\par ESV(MOD-sp2): 24 ml\par EF(MOD-sp2): 59.3 %\par SV(MOD-sp4): 34 ml\par SI(MOD-sp4): 15.9 ml/m^2\par SV(MOD-sp2): 35 ml\par SI(MOD-sp2): 16.4 ml/m^2\par Interpreting Physician:Judy Acosta MD electronically signed on \par 2018 13:17:46\par \par \par \par \par \par \par \par "Thank you for the opportunity to participate in the care of this \par patient."\par \par \par \par JUDY ACOSTA M.D., ATTENDING CARDIOLOGIST\par This document has been electronically signed. 2018 1:17PM\par  \par \par \par EXAM: EKG STRESS TEST (CARDIOL) \par \par PROCEDURE DATE: 2018 \par \par \par \par INTERPRETATION: EXERCISE STRESS TREADMILL TEST\par \par Indication: Cardiac Risk Assessment. \par \par Exercise Performance:\par The patient exercised for 12 min and 17 secs on a Con protocol achieved \par a peak workload of 13.90 METS. The patient's baseline heart rate was 67 \par bpm and the peak heart rate achieved was 153 bpm which represents 97% of \par the patient's maximal predicted heart rate. The patient's baseline blood \par pressure was 150/80 mm Hg and the peak blood pressure was 190/80 mm Hg. \par The double product was 29,070.\par \par EKG at rest demonstrated normal sinus rhythm with nonspecific ST-T \par changes. At peak exercise there were no significant ST?T?changes noted. \par \par IMPRESSION: \par \par 1. Normal exercise stress EKG test. \par 2. Normal heart rate and blood pressure response.\par 3. Excellent exercise tolerance. \par \par \par \par \par \par "Thank you for the opportunity to participate in the care of this \par patient."\par \par  \par

## 2020-11-23 NOTE — DISCUSSION/SUMMARY
[Patient] : the patient [___ Month(s)] : [unfilled] month(s) [FreeTextEntry1] : 66 y/o male with h/o hypothyroid, htn, hl, migraine, carotid disease who presents for f/up today\par \par -repeat carotid u/s yearly - carotid 8/20: small plaque both carotid bulbs R>L, no HD significant stenosis\par -ekg 7/20 - SB, rvcd, nsra\par -ETT: 7/18: normal\par -Echo 7/18: normal ef, no significant valvular disease\par -asa 81 mg qd\par -continue statin\par -continue ACE, norvasc 5 mg qd\par -labs 2020 reviewed\par -counseled on CVD risk factors and advised he d/c buproprion if possible given htn s.effect\par -limit supplement use\par -f/up 6 months\par

## 2021-02-04 ENCOUNTER — RX RENEWAL (OUTPATIENT)
Age: 66
End: 2021-02-04

## 2021-04-20 ENCOUNTER — TRANSCRIPTION ENCOUNTER (OUTPATIENT)
Age: 66
End: 2021-04-20

## 2021-04-28 ENCOUNTER — TRANSCRIPTION ENCOUNTER (OUTPATIENT)
Age: 66
End: 2021-04-28

## 2021-04-29 VITALS — WEIGHT: 195 LBS | BODY MASS INDEX: 26.41 KG/M2 | HEIGHT: 72 IN

## 2021-04-30 ENCOUNTER — TRANSCRIPTION ENCOUNTER (OUTPATIENT)
Age: 66
End: 2021-04-30

## 2021-05-04 ENCOUNTER — RX RENEWAL (OUTPATIENT)
Age: 66
End: 2021-05-04

## 2021-05-10 ENCOUNTER — APPOINTMENT (OUTPATIENT)
Dept: INTERNAL MEDICINE | Facility: CLINIC | Age: 66
End: 2021-05-10
Payer: MEDICARE

## 2021-05-10 ENCOUNTER — NON-APPOINTMENT (OUTPATIENT)
Age: 66
End: 2021-05-10

## 2021-05-10 VITALS
BODY MASS INDEX: 25.19 KG/M2 | HEART RATE: 72 BPM | WEIGHT: 186 LBS | SYSTOLIC BLOOD PRESSURE: 116 MMHG | HEIGHT: 72 IN | OXYGEN SATURATION: 97 % | DIASTOLIC BLOOD PRESSURE: 67 MMHG | TEMPERATURE: 98.6 F

## 2021-05-10 DIAGNOSIS — D50.9 IRON DEFICIENCY ANEMIA, UNSPECIFIED: ICD-10-CM

## 2021-05-10 DIAGNOSIS — Z00.00 ENCOUNTER FOR GENERAL ADULT MEDICAL EXAMINATION W/OUT ABNORMAL FINDINGS: ICD-10-CM

## 2021-05-10 DIAGNOSIS — E55.9 VITAMIN D DEFICIENCY, UNSPECIFIED: ICD-10-CM

## 2021-05-10 DIAGNOSIS — E03.9 HYPOTHYROIDISM, UNSPECIFIED: ICD-10-CM

## 2021-05-10 DIAGNOSIS — F32.9 MAJOR DEPRESSIVE DISORDER, SINGLE EPISODE, UNSPECIFIED: ICD-10-CM

## 2021-05-10 DIAGNOSIS — E78.5 HYPERLIPIDEMIA, UNSPECIFIED: ICD-10-CM

## 2021-05-10 DIAGNOSIS — I77.9 DISORDER OF ARTERIES AND ARTERIOLES, UNSPECIFIED: ICD-10-CM

## 2021-05-10 DIAGNOSIS — Z12.5 ENCOUNTER FOR SCREENING FOR MALIGNANT NEOPLASM OF PROSTATE: ICD-10-CM

## 2021-05-10 DIAGNOSIS — I10 ESSENTIAL (PRIMARY) HYPERTENSION: ICD-10-CM

## 2021-05-10 DIAGNOSIS — L30.9 DERMATITIS, UNSPECIFIED: ICD-10-CM

## 2021-05-10 PROCEDURE — G0439: CPT

## 2021-05-10 PROCEDURE — 36415 COLL VENOUS BLD VENIPUNCTURE: CPT

## 2021-05-10 PROCEDURE — G0444 DEPRESSION SCREEN ANNUAL: CPT | Mod: 59

## 2021-05-10 RX ORDER — ALCOHOL 62 ML/100ML
10 LIQUID TOPICAL DAILY
Qty: 90 | Refills: 0 | Status: ACTIVE | COMMUNITY

## 2021-05-10 RX ORDER — LISINOPRIL 20 MG/1
20 TABLET ORAL
Qty: 30 | Refills: 6 | Status: COMPLETED | COMMUNITY
Start: 2018-11-15 | End: 2021-05-10

## 2021-05-10 RX ORDER — PRASTERONE (DHEA) 25 MG
25 CAPSULE ORAL
Refills: 0 | Status: COMPLETED | COMMUNITY
End: 2021-05-10

## 2021-05-10 RX ORDER — BIOTIN 10 MG
TABLET ORAL
Refills: 0 | Status: COMPLETED | COMMUNITY
End: 2021-05-10

## 2021-05-10 RX ORDER — BUPROPION HYDROCHLORIDE 100 MG/1
100 TABLET, FILM COATED ORAL DAILY
Refills: 0 | Status: COMPLETED | COMMUNITY
End: 2021-05-10

## 2021-05-10 RX ORDER — BUPROPION HYDROCHLORIDE 100 MG/1
100 TABLET, FILM COATED, EXTENDED RELEASE ORAL DAILY
Qty: 90 | Refills: 3 | Status: ACTIVE | COMMUNITY
Start: 2021-04-20

## 2021-05-10 NOTE — HISTORY OF PRESENT ILLNESS
[FreeTextEntry1] : annual exam [de-identified] : annual\par - doing well over all\par \par CAD\par - stable\par - compliatn w/ medications\par \par HCM\par - up to date

## 2021-05-10 NOTE — PHYSICAL EXAM
[No Acute Distress] : no acute distress [Well Nourished] : well nourished [Well Developed] : well developed [Well-Appearing] : well-appearing [EOMI] : extraocular movements intact [Normal Outer Ear/Nose] : the outer ears and nose were normal in appearance [No JVD] : no jugular venous distention [No Lymphadenopathy] : no lymphadenopathy [Supple] : supple [Thyroid Normal, No Nodules] : the thyroid was normal and there were no nodules present [No Respiratory Distress] : no respiratory distress  [No Accessory Muscle Use] : no accessory muscle use [Clear to Auscultation] : lungs were clear to auscultation bilaterally [Normal Rate] : normal rate  [Regular Rhythm] : with a regular rhythm [Normal S1, S2] : normal S1 and S2 [No Murmur] : no murmur heard [No Edema] : there was no peripheral edema [No Extremity Clubbing/Cyanosis] : no extremity clubbing/cyanosis [Soft] : abdomen soft [Non Tender] : non-tender [Non-distended] : non-distended [No Masses] : no abdominal mass palpated [No HSM] : no HSM [No Rash] : no rash [Coordination Grossly Intact] : coordination grossly intact [No Focal Deficits] : no focal deficits [Normal Gait] : normal gait [Normal Affect] : the affect was normal [Alert and Oriented x3] : oriented to person, place, and time [Normal Mood] : the mood was normal [Normal Insight/Judgement] : insight and judgment were intact

## 2021-05-11 ENCOUNTER — TRANSCRIPTION ENCOUNTER (OUTPATIENT)
Age: 66
End: 2021-05-11

## 2021-05-17 ENCOUNTER — TRANSCRIPTION ENCOUNTER (OUTPATIENT)
Age: 66
End: 2021-05-17

## 2021-05-21 LAB
25(OH)D3 SERPL-MCNC: 35.5 NG/ML
ALBUMIN SERPL ELPH-MCNC: 4.6 G/DL
ALP BLD-CCNC: 76 U/L
ALT SERPL-CCNC: 27 U/L
ANION GAP SERPL CALC-SCNC: 13 MMOL/L
APPEARANCE: CLEAR
AST SERPL-CCNC: 29 U/L
BILIRUB SERPL-MCNC: 0.3 MG/DL
BILIRUBIN URINE: NEGATIVE
BLOOD URINE: NEGATIVE
BUN SERPL-MCNC: 24 MG/DL
CALCIUM SERPL-MCNC: 9.8 MG/DL
CHLORIDE SERPL-SCNC: 110 MMOL/L
CHOLEST SERPL-MCNC: 149 MG/DL
CO2 SERPL-SCNC: 22 MMOL/L
COLOR: YELLOW
CREAT SERPL-MCNC: 1.09 MG/DL
ESTIMATED AVERAGE GLUCOSE: 100 MG/DL
FERRITIN SERPL-MCNC: 36 NG/ML
GLUCOSE QUALITATIVE U: NEGATIVE
GLUCOSE SERPL-MCNC: 94 MG/DL
HBA1C MFR BLD HPLC: 5.1 %
HDLC SERPL-MCNC: 59 MG/DL
IRON SATN MFR SERPL: 18 %
IRON SERPL-MCNC: 66 UG/DL
KETONES URINE: NORMAL
LDLC SERPL CALC-MCNC: 67 MG/DL
LEUKOCYTE ESTERASE URINE: NEGATIVE
NITRITE URINE: NEGATIVE
NONHDLC SERPL-MCNC: 89 MG/DL
PH URINE: 5.5
POTASSIUM SERPL-SCNC: 4.4 MMOL/L
PROT SERPL-MCNC: 6.5 G/DL
PROTEIN URINE: NORMAL
PSA SERPL-MCNC: 1.2 NG/ML
SODIUM SERPL-SCNC: 145 MMOL/L
SPECIFIC GRAVITY URINE: 1.03
TIBC SERPL-MCNC: 361 UG/DL
TRIGL SERPL-MCNC: 111 MG/DL
TSH SERPL-ACNC: 1.09 UIU/ML
UIBC SERPL-MCNC: 295 UG/DL
UROBILINOGEN URINE: NORMAL

## 2021-05-24 ENCOUNTER — APPOINTMENT (OUTPATIENT)
Dept: HEART AND VASCULAR | Facility: CLINIC | Age: 66
End: 2021-05-24
Payer: MEDICARE

## 2021-05-24 ENCOUNTER — NON-APPOINTMENT (OUTPATIENT)
Age: 66
End: 2021-05-24

## 2021-05-24 VITALS
BODY MASS INDEX: 25.47 KG/M2 | DIASTOLIC BLOOD PRESSURE: 80 MMHG | HEIGHT: 72 IN | WEIGHT: 188 LBS | HEART RATE: 61 BPM | SYSTOLIC BLOOD PRESSURE: 122 MMHG | TEMPERATURE: 98 F

## 2021-05-24 PROCEDURE — 99214 OFFICE O/P EST MOD 30 MIN: CPT | Mod: 25

## 2021-05-24 PROCEDURE — 93000 ELECTROCARDIOGRAM COMPLETE: CPT

## 2021-05-24 NOTE — HISTORY OF PRESENT ILLNESS
[FreeTextEntry1] : 66 y/o male with h/o hypothyroid, htn, hl, migraine, carotid disease who presents for f/up today.\par \par Last seen  \par \par \par carotid : small plaque both carotid bulbs R>L, no HD significant stenosis\par \par No cp, sob, syncope, edema, palpitation, orthopnea, pnd\par \par \par \par : ETT and Echo which were normal\par \par carotid u/s : mild calcific plaque right carotid bifurcation/right prox ICA\par \par On statin since \par \par Exercises with ellipitical 2 hours - 4-5 times/week\par Eats healthy\par Low stress\par 7 hours sleep\par No mental health issues\par \par Food allergies to milk/soy\par \par \par \par PMH/PSH:\par hypothyroid\par seasonal allergies\par gerd\par htn\par hl\par brissa\par migraine\par alfred neuroma surgery right foot \par uvelectomy for frank \par \par \par \par \par ALL:\par ppi - rash\par \par \par \par SH:\par no tobacco\par 1 glass wine 3-4 times/week\par no drugs\par works as artist/real estate\par from [place]\par , re-\par daughter - 39, Marfan's\par \par \par \par \par MEDS:\par synthroid 75 mcg qd\par fluticasone 50 mcg per nostril\par azelastine 137 mcg 2 sprays/nostril bid\par loratidine 10 mg qd\par ranitidine 150 mg qd\par buproprion 100 mg qd\par lisinopril 40 mg qd\par lamotrigine 100 mg bid\par D3 2000iu qd\par omega 3 1200 bid\par asa 325 mg qd\par biotin 5000 mcg qd\par dhea 25 mg qod\par mg 500 mg \par lipitor 80 mg qhs\par norvasc 5 mg qd\par \par \par FH:\par mother - cabg 80's,  91\par father - cabg 70's,  93\par brother - 68, alive\par sister - 70, alive, RA, deafness\par \par \par EXAM: ECHOCARDIOGRAM (CARDIOL) \par \par PROCEDURE DATE: 2018 \par \par \par \par INTERPRETATION: Patient Height: 182.9 cm\par Patient Weight: 91.6 kg\par Heart Rate: 54 bpm\par Systolic Pressure: 143 mmHg\par Diastolic Pressure: 72 mmHg\par BSA: 2.1 m^2\par Interpretation Summary\par There is mild concentric left ventricular hypertrophy.The left \par ventricular wall\par  motion is normal.The left ventricular ejection fraction is estimated to \par be\par  55-60%The left atrial size is normal.Right atrial size is normal.The \par right\par  ventricle is normal in size and function.Structurally normal aortic \par valve.No\par  aortic regurgitation noted.There is mild to moderate mitral annular\par  calcification.Mitral valve thickening noted.Structurally normal tricuspid\par  valve.No tricuspid regurgitation noted.No aortic root \par dilatation.Structurally\par  normal pulmonic valve.There is trace to mild pulmonic \par regurgitation.There is\par  no pericardial effusion.\par Procedure Details\par A complete two-dimensional transthoracic echocardiogram was performed (2D,\par M-mode, spectral and color flow doppler).\par Study Quality: Good.\par Left Ventricle\par There is mild concentric left ventricular hypertrophy.\par The left ventricular wall motion is normal.\par The left ventricular ejection fraction is normal.\par The left ventricular ejection fraction is estimated to be 55-60%\par Left Atrium\par The left atrial size is normal.\par Right Atrium\par Right atrial size is normal.\par Right Ventricle\par The right ventricle is normal in size and function.\par Aortic Valve\par Structurally normal aortic valve.\par No aortic regurgitation noted.\par Mitral Valve\par There is mild to moderate mitral annular calcification.\par Mitral valve thickening noted.\par No mitral regurgitation noted.\par Tricuspid Valve\par Structurally normal tricuspid valve.\par No tricuspid regurgitation noted.\par Pulmonic Valve\par Structurally normal pulmonic valve.\par There is trace to mild pulmonic regurgitation.\par Arteries and Venous System\par No aortic root dilatation.\par The inferior vena cava is normal in size (<2.1 cm) with normal inspiratory\par collapse (>50%) consistent with normal right atrial pressure.\par Pericardium / Pleura\par There is no pericardial effusion.\par Doppler Measurements & Calculations\par MV E point: 60.7 cm/sec\par MV A point: 57.8 cm/sec\par MV E/A: 1.1\par Ao V2 mean: 102.5 cm/sec\par Ao mean P.7 mmHg\par Ao mean PG (full): 1.5 mmHg\par Ao V2 VTI: 28.9 cm\par JALYN(I,A): 3.6 cm^2\par JALYN(I,D): 3.6 cm^2\par LV max P.3 mmHg\par LV mean PG: 3.2 mmHg\par LV V1 max: 125.9 cm/sec\par LV V1 mean: 85.1 cm/sec\par LV V1 VTI: 24.6 cm\par SV(Ao): 316.6 ml\par SI(Ao): 148.0 ml/m^2\par SV(LVOT): 104.2 ml\par SI(LVOT): 48.7 ml/m^2\par TR Max viky: 249.8 cm/sec\par MMode 2D Measurements & Calculations\par IVSd: 1.3 cm\par LVIDd: 4.7 cm\par LVIDs: 2.9 cm\par LVPWd: 1.2 cm\par IVS/LVPW: 1.1\par FS: 38.7 %\par EDV(Teich): 104.0 ml\par ESV(Teich): 32.2 ml\par LV mass(C)d: 220.1 grams\par LV mass(C)dI: 102.9 grams/m^2\par SI(cubed): 38.2 ml/m^2\par Ao root diam: 3.7 cm\par Ao root area: 11.0 cm^2\par LA dimension: 4.3 cm\par asc Aorta: 3.5 cm\par LA/Ao: 1.1\par LVOT diam: 2.3 cm\par LVOT area: 4.2 cm^2\par LVOT area (M): 4.2 cm^2\par LVLd ap4: 8.0 cm\par EDV(MOD-sp4): 61 ml\par LVLs ap4: 6.1 cm\par ESV(MOD-sp4): 27 ml\par EF(MOD-sp4): 55.7 %\par LVLd ap2: 7.3 cm\par EDV(MOD-sp2): 59 ml\par LVLs ap2: 6.3 cm\par ESV(MOD-sp2): 24 ml\par EF(MOD-sp2): 59.3 %\par SV(MOD-sp4): 34 ml\par SI(MOD-sp4): 15.9 ml/m^2\par SV(MOD-sp2): 35 ml\par SI(MOD-sp2): 16.4 ml/m^2\par Interpreting Physician:Judy Acosta MD electronically signed on \par 2018 13:17:46\par \par \par \par \par \par \par \par "Thank you for the opportunity to participate in the care of this \par patient."\par \par \par \par JUDY ACOSTA M.D., ATTENDING CARDIOLOGIST\par This document has been electronically signed. 2018 1:17PM\par  \par \par \par EXAM: EKG STRESS TEST (CARDIOL) \par \par PROCEDURE DATE: 2018 \par \par \par \par INTERPRETATION: EXERCISE STRESS TREADMILL TEST\par \par Indication: Cardiac Risk Assessment. \par \par Exercise Performance:\par The patient exercised for 12 min and 17 secs on a Con protocol achieved \par a peak workload of 13.90 METS. The patient's baseline heart rate was 67 \par bpm and the peak heart rate achieved was 153 bpm which represents 97% of \par the patient's maximal predicted heart rate. The patient's baseline blood \par pressure was 150/80 mm Hg and the peak blood pressure was 190/80 mm Hg. \par The double product was 29,070.\par \par EKG at rest demonstrated normal sinus rhythm with nonspecific ST-T \par changes. At peak exercise there were no significant ST?T?changes noted. \par \par IMPRESSION: \par \par 1. Normal exercise stress EKG test. \par 2. Normal heart rate and blood pressure response.\par 3. Excellent exercise tolerance. \par \par \par \par \par \par "Thank you for the opportunity to participate in the care of this \par patient."\par \par

## 2021-05-24 NOTE — DISCUSSION/SUMMARY
[Patient] : the patient [___ Month(s)] : in [unfilled] month(s) [FreeTextEntry1] : 66 y/o male with h/o hypothyroid, htn, hl, migraine, carotid disease who presents for f/up today\par \par -repeat carotid u/s 2-3 years - carotid 8/20: small plaque both carotid bulbs R>L, no HD significant stenosis\par -ekg ordered today - SR, rvcd, no st/t changes\par -ETT: 7/18: normal\par -Echo 7/18: normal ef, no significant valvular disease\par -advised to drop from asa 325 mg qd to asa 81 mg qd\par -continue statin\par -continue ACE, norvasc 5 mg qd\par -labs 2021 reviewed\par -counseled on CVD risk factors \par -f/up 6 months for htn\par \par I have spent 30 minutes reviewing labs, records, tests and discussing htn, cvd risk factor control \par

## 2021-06-03 RX ORDER — AZELASTINE HYDROCHLORIDE 137 UG/1
0.1 SPRAY, METERED NASAL TWICE DAILY
Qty: 1 | Refills: 3 | Status: ACTIVE | COMMUNITY
Start: 1900-01-01 | End: 1900-01-01

## 2021-07-26 ENCOUNTER — APPOINTMENT (OUTPATIENT)
Dept: DERMATOLOGY | Facility: CLINIC | Age: 66
End: 2021-07-26
Payer: MEDICARE

## 2021-07-26 DIAGNOSIS — Z12.83 ENCOUNTER FOR SCREENING FOR MALIGNANT NEOPLASM OF SKIN: ICD-10-CM

## 2021-07-26 DIAGNOSIS — L71.9 ROSACEA, UNSPECIFIED: ICD-10-CM

## 2021-07-26 DIAGNOSIS — L21.9 SEBORRHEIC DERMATITIS, UNSPECIFIED: ICD-10-CM

## 2021-07-26 DIAGNOSIS — L82.1 OTHER SEBORRHEIC KERATOSIS: ICD-10-CM

## 2021-07-26 PROCEDURE — 99204 OFFICE O/P NEW MOD 45 MIN: CPT

## 2021-07-26 RX ORDER — KETOCONAZOLE 20.5 MG/ML
2 SHAMPOO, SUSPENSION TOPICAL
Qty: 1 | Refills: 11 | Status: ACTIVE | COMMUNITY
Start: 2021-07-26 | End: 1900-01-01

## 2021-07-26 RX ORDER — METRONIDAZOLE 7.5 MG/G
0.75 CREAM TOPICAL
Qty: 1 | Refills: 3 | Status: ACTIVE | COMMUNITY
Start: 2021-07-26 | End: 1900-01-01

## 2021-07-26 RX ORDER — HYDROCORTISONE 25 MG/G
2.5 CREAM TOPICAL
Qty: 1 | Refills: 2 | Status: ACTIVE | COMMUNITY
Start: 2021-07-26 | End: 1900-01-01

## 2021-07-26 RX ORDER — KETOCONAZOLE 20 MG/G
2 CREAM TOPICAL
Qty: 1 | Refills: 3 | Status: ACTIVE | COMMUNITY
Start: 2021-07-26 | End: 1900-01-01

## 2021-07-26 NOTE — PHYSICAL EXAM
[Alert] : alert [Oriented x 3] : ~L oriented x 3 [Well Nourished] : well nourished [Conjunctiva Non-injected] : conjunctiva non-injected [No Visual Lymphadenopathy] : no visual  lymphadenopathy [No Clubbing] : no clubbing [No Edema] : no edema [No Bromhidrosis] : no bromhidrosis [No Chromhidrosis] : no chromhidrosis [Full Body Skin Exam Performed] : performed [FreeTextEntry3] : brown stuck on papules back\par few small pink pustules medial cheeks and nose\par mild flaking scalp\par

## 2021-07-26 NOTE — HISTORY OF PRESENT ILLNESS
[FreeTextEntry1] : moles [de-identified] : here for mole check, no hx skin cancer, enlarging black spot on back \par also rosacea - flare ups occasionally and used a cream\par flaking scalp and face - hydrocortisone 2.5 and econazole

## 2021-09-01 ENCOUNTER — RX RENEWAL (OUTPATIENT)
Age: 66
End: 2021-09-01

## 2021-09-30 ENCOUNTER — TRANSCRIPTION ENCOUNTER (OUTPATIENT)
Age: 66
End: 2021-09-30

## 2021-10-22 ENCOUNTER — RX RENEWAL (OUTPATIENT)
Age: 66
End: 2021-10-22

## 2021-10-22 RX ORDER — AZELASTINE HYDROCHLORIDE 137 UG/1
137 SPRAY, METERED NASAL
Qty: 30 | Refills: 0 | Status: ACTIVE | COMMUNITY
Start: 2021-10-22 | End: 1900-01-01

## 2021-11-08 ENCOUNTER — RX RENEWAL (OUTPATIENT)
Age: 66
End: 2021-11-08

## 2021-11-08 RX ORDER — FLUTICASONE PROPIONATE 50 UG/1
50 SPRAY, METERED NASAL
Qty: 1 | Refills: 0 | Status: ACTIVE | COMMUNITY
Start: 2020-09-01 | End: 1900-01-01

## 2021-11-16 ENCOUNTER — TRANSCRIPTION ENCOUNTER (OUTPATIENT)
Age: 66
End: 2021-11-16

## 2021-11-18 ENCOUNTER — APPOINTMENT (OUTPATIENT)
Dept: HEART AND VASCULAR | Facility: CLINIC | Age: 66
End: 2021-11-18
Payer: MEDICARE

## 2021-11-18 VITALS
TEMPERATURE: 97.5 F | OXYGEN SATURATION: 97 % | HEART RATE: 63 BPM | BODY MASS INDEX: 26.04 KG/M2 | DIASTOLIC BLOOD PRESSURE: 75 MMHG | WEIGHT: 192 LBS | SYSTOLIC BLOOD PRESSURE: 125 MMHG

## 2021-11-18 PROCEDURE — 99214 OFFICE O/P EST MOD 30 MIN: CPT | Mod: 25

## 2021-11-18 PROCEDURE — 93000 ELECTROCARDIOGRAM COMPLETE: CPT

## 2021-11-18 NOTE — HISTORY OF PRESENT ILLNESS
[FreeTextEntry1] : 67 y/o male with h/o hypothyroid, htn, hl, migraine, carotid disease who presents for f/up today.\par \par Last seen \par No cp, sob, syncope, edema, palpitation, orthopnea, pnd\par \par carotid : small plaque both carotid bulbs R>L, no HD significant stenosis\par \par \par : ETT and Echo which were normal\par \par carotid u/s : mild calcific plaque right carotid bifurcation/right prox ICA\par \par On statin since \par \par Exercises with ellipitical 2 hours - 4-5 times/week\par Eats healthy\par Low stress\par 7 hours sleep\par No mental health issues\par \par Food allergies to milk/soy\par \par \par \par PMH/PSH:\par hypothyroid\par seasonal allergies\par gerd\par htn\par hl\par brissa\par migraine\par alfred neuroma surgery right foot \par uvelectomy for frank \par \par \par \par \par ALL:\par ppi - rash\par \par \par \par SH:\par no tobacco\par 1 glass wine 3-4 times/week\par no drugs\par 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 \par EXAM: ECHOCARDIOGRAM (CARDIOL) \par \par PROCEDURE DATE: 2018 \par \par \par \par INTERPRETATION: Patient Height: 182.9 cm\par Patient Weight: 91.6 kg\par Heart Rate: 54 bpm\par Systolic Pressure: 143 mmHg\par Diastolic Pressure: 72 mmHg\par BSA: 2.1 m^2\par Interpretation Summary\par There is mild concentric left ventricular hypertrophy.The left \par ventricular wall\par  motion is normal.The left ventricular ejection fraction is estimated to \par be\par  55-60%The left atrial size is normal.Right atrial size is normal.The \par right\par  ventricle is normal in size and function.Structurally normal aortic \par valve.No\par  aortic regurgitation noted.There is mild to moderate mitral annular\par  calcification.Mitral valve thickening noted.Structurally normal tricuspid\par  valve.No tricuspid regurgitation noted.No aortic root \par dilatation.Structurally\par  normal pulmonic valve.There is trace to mild pulmonic \par regurgitation.There is\par  no pericardial effusion.\par Procedure Details\par A complete two-dimensional transthoracic echocardiogram was performed (2D,\par M-mode, spectral and color flow doppler).\par Study Quality: Good.\par Left Ventricle\par There is mild concentric left ventricular hypertrophy.\par The left ventricular wall motion is normal.\par The left ventricular ejection fraction is normal.\par The left ventricular ejection fraction is estimated to be 55-60%\par Left Atrium\par The left atrial size is normal.\par Right Atrium\par Right atrial size is normal.\par Right Ventricle\par The right ventricle is normal in size and function.\par Aortic Valve\par Structurally normal aortic valve.\par No aortic regurgitation noted.\par Mitral Valve\par There is mild to moderate mitral annular calcification.\par Mitral valve thickening noted.\par No mitral regurgitation noted.\par Tricuspid Valve\par Structurally normal tricuspid valve.\par No tricuspid regurgitation noted.\par Pulmonic Valve\par Structurally normal pulmonic valve.\par There is trace to mild pulmonic regurgitation.\par Arteries and Venous System\par No aortic root dilatation.\par The inferior vena cava is normal in size (<2.1 cm) with normal inspiratory\par collapse (>50%) consistent with normal right atrial pressure.\par Pericardium / Pleura\par There is no pericardial effusion.\par Doppler Measurements & Calculations\par MV E point: 60.7 cm/sec\par MV A point: 57.8 cm/sec\par MV E/A: 1.1\par Ao V2 mean: 102.5 cm/sec\par Ao mean P.7 mmHg\par Ao mean PG (full): 1.5 mmHg\par Ao V2 VTI: 28.9 cm\par JALYN(I,A): 3.6 cm^2\par JALYN(I,D): 3.6 cm^2\par LV max P.3 mmHg\par LV mean PG: 3.2 mmHg\par LV V1 max: 125.9 cm/sec\par LV V1 mean: 85.1 cm/sec\par LV V1 VTI: 24.6 cm\par SV(Ao): 316.6 ml\par SI(Ao): 148.0 ml/m^2\par SV(LVOT): 104.2 ml\par SI(LVOT): 48.7 ml/m^2\par TR Max viky: 249.8 cm/sec\par MMode 2D Measurements & Calculations\par IVSd: 1.3 cm\par LVIDd: 4.7 cm\par LVIDs: 2.9 cm\par LVPWd: 1.2 cm\par IVS/LVPW: 1.1\par FS: 38.7 %\par EDV(Teich): 104.0 ml\par ESV(Teich): 32.2 ml\par LV mass(C)d: 220.1 grams\par LV mass(C)dI: 102.9 grams/m^2\par SI(cubed): 38.2 ml/m^2\par Ao root diam: 3.7 cm\par Ao root area: 11.0 cm^2\par LA dimension: 4.3 cm\par asc Aorta: 3.5 cm\par LA/Ao: 1.1\par LVOT diam: 2.3 cm\par LVOT area: 4.2 cm^2\par LVOT area (M): 4.2 cm^2\par LVLd ap4: 8.0 cm\par EDV(MOD-sp4): 61 ml\par LVLs ap4: 6.1 cm\par ESV(MOD-sp4): 27 ml\par EF(MOD-sp4): 55.7 %\par LVLd ap2: 7.3 cm\par EDV(MOD-sp2): 59 ml\par LVLs ap2: 6.3 cm\par ESV(MOD-sp2): 24 ml\par EF(MOD-sp2): 59.3 %\par SV(MOD-sp4): 34 ml\par SI(MOD-sp4): 15.9 ml/m^2\par SV(MOD-sp2): 35 ml\par SI(MOD-sp2): 16.4 ml/m^2\par Interpreting Physician:Judy Acosta MD electronically signed on \par 2018 13:17:46\par \par \par \par \par \par \par \par "Thank you for the opportunity to participate in the care of this \par patient."\par \par \par \par JUDY ACOSTA M.D., ATTENDING CARDIOLOGIST\par This document has been electronically signed. 2018 1:17PM\par  \par \par \par EXAM: EKG STRESS TEST (CARDIOL) \par \par PROCEDURE DATE: 2018 \par \par \par \par INTERPRETATION: EXERCISE STRESS TREADMILL TEST\par \par Indication: Cardiac Risk Assessment. \par \par Exercise Performance:\par The patient exercised for 12 min and 17 secs on a Con protocol achieved \par a peak workload of 13.90 METS. The patient's baseline heart rate was 67 \par bpm and the peak heart rate achieved was 153 bpm which represents 97% of \par the patient's maximal predicted heart rate. The patient's baseline blood \par pressure was 150/80 mm Hg and the peak blood pressure was 190/80 mm Hg. \par The double product was 29,070.\par \par EKG at rest demonstrated normal sinus rhythm with nonspecific ST-T \par changes. At peak exercise there were no significant ST?T?changes noted. \par \par IMPRESSION: \par \par 1. Normal exercise stress EKG test. \par 2. Normal heart rate and blood pressure response.\par 3. Excellent exercise tolerance. \par \par \par \par \par \par "Thank you for the opportunity to participate in the care of this \par patient."\par

## 2021-11-18 NOTE — PHYSICAL EXAM
[Normal] : well developed, well nourished, no acute distress [Well Developed] : well developed [Well Nourished] : well nourished [No Acute Distress] : no acute distress normal... Well appearing, well nourished, awake, alert, oriented to person, place, time/situation and in no apparent distress.

## 2021-11-18 NOTE — DISCUSSION/SUMMARY
[Patient] : the patient [___ Month(s)] : in [unfilled] month(s) [FreeTextEntry1] : 65 y/o male with h/o hypothyroid, htn, hl, migraine, carotid disease who presents for f/up today\par \par -repeat carotid u/s 2-3 years - carotid 8/20: small plaque both carotid bulbs R>L, no HD significant stenosis\par -ekg ordered today - SB, rvcd, no st/t changes\par -ETT: 7/18: normal\par -Echo 7/18: normal ef, no significant valvular disease\par -advised to drop from asa 325 mg qd to asa 81 mg qd\par -continue statin\par -continue ACE, norvasc 5 mg qd\par -labs 2021 reviewed\par -counseled on CVD risk factors \par -f/up 6 months for htn\par \par I have spent 30 minutes reviewing labs, records, tests and discussing htn, cvd risk factor control

## 2021-12-13 ENCOUNTER — TRANSCRIPTION ENCOUNTER (OUTPATIENT)
Age: 66
End: 2021-12-13

## 2022-05-02 ENCOUNTER — TRANSCRIPTION ENCOUNTER (OUTPATIENT)
Age: 67
End: 2022-05-02

## 2022-05-05 ENCOUNTER — APPOINTMENT (OUTPATIENT)
Dept: HEART AND VASCULAR | Facility: CLINIC | Age: 67
End: 2022-05-05
Payer: MEDICARE

## 2022-05-05 ENCOUNTER — NON-APPOINTMENT (OUTPATIENT)
Age: 67
End: 2022-05-05

## 2022-05-05 VITALS
HEIGHT: 72 IN | DIASTOLIC BLOOD PRESSURE: 76 MMHG | TEMPERATURE: 97.8 F | HEART RATE: 69 BPM | BODY MASS INDEX: 26.14 KG/M2 | SYSTOLIC BLOOD PRESSURE: 115 MMHG | WEIGHT: 193 LBS

## 2022-05-05 PROCEDURE — 99214 OFFICE O/P EST MOD 30 MIN: CPT | Mod: 25

## 2022-05-05 PROCEDURE — 93000 ELECTROCARDIOGRAM COMPLETE: CPT

## 2022-05-05 PROCEDURE — 36415 COLL VENOUS BLD VENIPUNCTURE: CPT

## 2022-05-05 NOTE — HISTORY OF PRESENT ILLNESS
[FreeTextEntry1] : 67 y/o male with h/o hypothyroid, htn, hl, migraine, carotid disease who presents for f/up today.\par \par Last seen \par No cp, sob, syncope, edema, palpitation, orthopnea, pnd\par \par carotid : small plaque both carotid bulbs R>L, no HD significant stenosis\par \par \par : ETT and Echo which were normal\par \par carotid u/s : mild calcific plaque right carotid bifurcation/right prox ICA\par \par On statin since \par \par Exercises with ellipitical 2 hours - 4-5 times/week\par Eats healthy\par Low stress\par 7 hours sleep\par No mental health issues\par \par Food allergies to milk/soy\par \par \par \par PMH/PSH:\par hypothyroid\par seasonal allergies\par gerd\par htn\par hl\par brissa\par migraine\par alfred neuroma surgery right foot \par uvelectomy for frank \par \par \par \par \par ALL:\par ppi - rash\par \par \par \par SH:\par no tobacco\par 1 glass wine 3-4 times/week\par no drugs\par 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 \par

## 2022-05-05 NOTE — DISCUSSION/SUMMARY
[Patient] : the patient [___ Month(s)] : in [unfilled] month(s) [FreeTextEntry1] : 67 y/o male with h/o hypothyroid, htn, hl, migraine, carotid disease who presents for f/up today\par \par -repeat carotid u/s 2023 - carotid 8/20: small plaque both carotid bulbs R>L, no HD significant stenosis\par -ekg ordered today - SB, rvcd, no st/t changes\par -ETT: 7/18: normal\par -Echo 7/18: normal ef, no significant valvular disease\par -continue asa 81 mg qd\par -continue statin\par -continue ACE, norvasc 5 mg qd\par -labs 2021 reviewed, ordered labs today\par -counseled on CVD risk factors \par -f/up 6 months for htn\par \par I have spent 30 minutes reviewing labs, records, tests and discussing htn, cvd risk factor control. \par

## 2022-05-06 LAB
ALBUMIN SERPL ELPH-MCNC: 4.6 G/DL
ALP BLD-CCNC: 72 U/L
ALT SERPL-CCNC: 24 U/L
ANION GAP SERPL CALC-SCNC: 15 MMOL/L
AST SERPL-CCNC: 27 U/L
BASOPHILS # BLD AUTO: 0.04 K/UL
BASOPHILS NFR BLD AUTO: 0.8 %
BILIRUB SERPL-MCNC: 0.4 MG/DL
BUN SERPL-MCNC: 21 MG/DL
CALCIUM SERPL-MCNC: 9.6 MG/DL
CHLORIDE SERPL-SCNC: 107 MMOL/L
CHOLEST SERPL-MCNC: 152 MG/DL
CO2 SERPL-SCNC: 18 MMOL/L
CREAT SERPL-MCNC: 1.06 MG/DL
CREAT SPEC-SCNC: 74 MG/DL
EGFR: 77 ML/MIN/1.73M2
EOSINOPHIL # BLD AUTO: 0.15 K/UL
EOSINOPHIL NFR BLD AUTO: 2.8 %
ESTIMATED AVERAGE GLUCOSE: 100 MG/DL
GLUCOSE SERPL-MCNC: 94 MG/DL
HBA1C MFR BLD HPLC: 5.1 %
HCT VFR BLD CALC: 42.1 %
HDLC SERPL-MCNC: 53 MG/DL
HGB BLD-MCNC: 13.3 G/DL
IMM GRANULOCYTES NFR BLD AUTO: 0.2 %
LDLC SERPL CALC-MCNC: 86 MG/DL
LYMPHOCYTES # BLD AUTO: 1.36 K/UL
LYMPHOCYTES NFR BLD AUTO: 25.7 %
MAGNESIUM SERPL-MCNC: 2.1 MG/DL
MAN DIFF?: NORMAL
MCHC RBC-ENTMCNC: 31.1 PG
MCHC RBC-ENTMCNC: 31.6 GM/DL
MCV RBC AUTO: 98.6 FL
MICROALBUMIN 24H UR DL<=1MG/L-MCNC: <1.2 MG/DL
MICROALBUMIN/CREAT 24H UR-RTO: NORMAL MG/G
MONOCYTES # BLD AUTO: 0.57 K/UL
MONOCYTES NFR BLD AUTO: 10.8 %
NEUTROPHILS # BLD AUTO: 3.16 K/UL
NEUTROPHILS NFR BLD AUTO: 59.7 %
NONHDLC SERPL-MCNC: 99 MG/DL
PLATELET # BLD AUTO: 250 K/UL
POTASSIUM SERPL-SCNC: 4.5 MMOL/L
PROT SERPL-MCNC: 6.5 G/DL
RBC # BLD: 4.27 M/UL
RBC # FLD: 13.5 %
SODIUM SERPL-SCNC: 140 MMOL/L
TRIGL SERPL-MCNC: 62 MG/DL
TSH SERPL-ACNC: 2.53 UIU/ML
WBC # FLD AUTO: 5.29 K/UL

## 2022-05-09 LAB
APPEARANCE: CLEAR
BACTERIA: NEGATIVE
BILIRUBIN URINE: NEGATIVE
BLOOD URINE: NEGATIVE
COLOR: NORMAL
GLUCOSE QUALITATIVE U: NEGATIVE
HYALINE CASTS: 0 /LPF
KETONES URINE: NEGATIVE
LEUKOCYTE ESTERASE URINE: NEGATIVE
MICROSCOPIC-UA: NORMAL
NITRITE URINE: NEGATIVE
PH URINE: 6
PROTEIN URINE: NEGATIVE
RED BLOOD CELLS URINE: 0 /HPF
SPECIFIC GRAVITY URINE: 1.01
SQUAMOUS EPITHELIAL CELLS: 0 /HPF
UROBILINOGEN URINE: NORMAL
WHITE BLOOD CELLS URINE: 0 /HPF

## 2022-06-13 ENCOUNTER — RX RENEWAL (OUTPATIENT)
Age: 67
End: 2022-06-13

## 2022-11-14 ENCOUNTER — NON-APPOINTMENT (OUTPATIENT)
Age: 67
End: 2022-11-14

## 2022-11-14 ENCOUNTER — APPOINTMENT (OUTPATIENT)
Dept: HEART AND VASCULAR | Facility: CLINIC | Age: 67
End: 2022-11-14

## 2022-11-14 VITALS — DIASTOLIC BLOOD PRESSURE: 83 MMHG | SYSTOLIC BLOOD PRESSURE: 126 MMHG

## 2022-11-14 VITALS
HEART RATE: 67 BPM | SYSTOLIC BLOOD PRESSURE: 131 MMHG | OXYGEN SATURATION: 97 % | DIASTOLIC BLOOD PRESSURE: 79 MMHG | TEMPERATURE: 97.5 F | HEIGHT: 72 IN | BODY MASS INDEX: 26.55 KG/M2 | WEIGHT: 196 LBS

## 2022-11-14 PROCEDURE — 93000 ELECTROCARDIOGRAM COMPLETE: CPT

## 2022-11-14 PROCEDURE — 99214 OFFICE O/P EST MOD 30 MIN: CPT | Mod: 25

## 2022-11-14 NOTE — DISCUSSION/SUMMARY
[Patient] : the patient [___ Month(s)] : in [unfilled] month(s) [EKG obtained to assist in diagnosis and management of assessed problem(s)] : EKG obtained to assist in diagnosis and management of assessed problem(s) [FreeTextEntry1] : 65 y/o male with h/o hypothyroid, htn, hl, migraine, carotid disease who presents for f/up today\par \par -repeat carotid u/s 2023 - carotid 8/20: small plaque both carotid bulbs R>L, no HD significant stenosis\par -ekg ordered today - SB, rvcd, no st/t changes\par -ETT: 7/18: normal\par -Echo 7/18: normal ef, no significant valvular disease\par -continue asa 81 mg qd\par -continue statin\par -continue ACE, norvasc 5 mg qd\par -labs 2022 reviewed \par -counseled on CVD risk factors \par -f/up 6 months for htn\par \par I have spent 30 minutes reviewing labs, records, tests and discussing htn, cvd risk factor control.

## 2022-11-14 NOTE — HISTORY OF PRESENT ILLNESS
[FreeTextEntry1] : 68 y/o male with h/o hypothyroid, htn, hl, migraine, carotid disease who presents for f/up today.\par \par Last seen \par No cp, sob, syncope, edema, palpitation, orthopnea, pnd\par \par carotid : small plaque both carotid bulbs R>L, no HD significant stenosis\par \par \par : ETT and Echo which were normal\par \par carotid u/s : mild calcific plaque right carotid bifurcation/right prox ICA\par \par On statin since \par \par Exercises with ellipitical 2 hours - 4-5 times/week\par Eats healthy\par Low stress\par 7 hours sleep\par No mental health issues\par \par Food allergies to milk/soy\par \par \par \par PMH/PSH:\par hypothyroid\par seasonal allergies\par gerd\par htn\par hl\par brissa\par migraine\par alfred neuroma surgery right foot \par uvelectomy for frank \par \par \par \par \par ALL:\par ppi - rash\par \par \par \par SH:\par no tobacco\par 1 glass wine 3-4 times/week\par no drugs\par works as artist/real estate\par from [place]\par , re-\par daughter - 39, Marfan's\par \par \par \par \par MEDS:\par synthroid 75 mcg qd\par fluticasone 50 mcg per nostril\par azelastine 137 mcg 2 sprays/nostril bid\par loratidine 10 mg qd\par ranitidine 150 mg qd\par buproprion 100 mg qd\par lisinopril 40 mg qd\par lamotrigine 100 mg bid\par D3 2000iu qd\par omega 3 1200 bid\par asa 81 mg qd\par biotin 5000 mcg qd\par dhea 25 mg qod\par mg 500 mg \par lipitor 80 mg qhs\par norvasc 5 mg qd\par \par \par FH:\par mother - cabg 80's,  91\par father - cabg 70's,  93\par brother - 68, alive\par sister - 70, alive, RA, deafness\par

## 2022-12-07 ENCOUNTER — TRANSCRIPTION ENCOUNTER (OUTPATIENT)
Age: 67
End: 2022-12-07

## 2023-05-15 ENCOUNTER — NON-APPOINTMENT (OUTPATIENT)
Age: 68
End: 2023-05-15

## 2023-05-15 ENCOUNTER — APPOINTMENT (OUTPATIENT)
Dept: HEART AND VASCULAR | Facility: CLINIC | Age: 68
End: 2023-05-15
Payer: MEDICARE

## 2023-05-15 VITALS
SYSTOLIC BLOOD PRESSURE: 110 MMHG | BODY MASS INDEX: 26.82 KG/M2 | WEIGHT: 198 LBS | HEIGHT: 72 IN | DIASTOLIC BLOOD PRESSURE: 70 MMHG | HEART RATE: 56 BPM

## 2023-05-15 DIAGNOSIS — I37.1 NONRHEUMATIC PULMONARY VALVE INSUFFICIENCY: ICD-10-CM

## 2023-05-15 PROCEDURE — 36415 COLL VENOUS BLD VENIPUNCTURE: CPT

## 2023-05-15 PROCEDURE — 93000 ELECTROCARDIOGRAM COMPLETE: CPT

## 2023-05-15 PROCEDURE — 99214 OFFICE O/P EST MOD 30 MIN: CPT | Mod: 25

## 2023-05-15 RX ORDER — PREDNISONE 20 MG/1
20 TABLET ORAL
Qty: 12 | Refills: 0 | Status: DISCONTINUED | COMMUNITY
Start: 2021-04-07 | End: 2023-05-15

## 2023-05-15 RX ORDER — ASPIRIN 325 MG/1
325 TABLET ORAL
Qty: 90 | Refills: 3 | Status: DISCONTINUED | COMMUNITY
End: 2023-05-15

## 2023-05-15 RX ORDER — OMEGA-3-ACID ETHYL ESTERS CAPSULES 1 G/1
1 CAPSULE, LIQUID FILLED ORAL
Refills: 0 | Status: DISCONTINUED | COMMUNITY
End: 2023-05-15

## 2023-05-15 RX ORDER — RANITIDINE HYDROCHLORIDE 150 MG/1
150 CAPSULE ORAL
Qty: 90 | Refills: 1 | Status: DISCONTINUED | COMMUNITY
End: 2023-05-15

## 2023-05-15 RX ORDER — PNV NO.95/FERROUS FUM/FOLIC AC 28MG-0.8MG
TABLET ORAL
Refills: 0 | Status: DISCONTINUED | COMMUNITY
End: 2023-05-15

## 2023-05-15 NOTE — HISTORY OF PRESENT ILLNESS
[FreeTextEntry1] : 68 y/o male with h/o hypothyroid, htn, hl, migraine, carotid disease who presents for f/up today.\par \par Last seen \par No cp, sob, syncope, edema, palpitation, orthopnea, pnd\par \par carotid : small plaque both carotid bulbs R>L, no HD significant stenosis\par \par \par : ETT and Echo which were normal\par \par carotid u/s : mild calcific plaque right carotid bifurcation/right prox ICA\par \par On statin since \par \par Exercises with ellipitical 2 hours - 4-5 times/week\par Eats healthy\par Low stress\par 7 hours sleep\par No mental health issues\par \par Food allergies to milk/soy\par \par \par \par PMH/PSH:\par hypothyroid\par seasonal allergies\par gerd\par htn\par hl\par brissa\par migraine\par alfred neuroma surgery right foot \par uvelectomy for frank \par \par \par \par \par ALL:\par ppi - rash\par \par \par \par SH:\par no tobacco\par 1 glass wine 3-4 times/week\par no drugs\par 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

## 2023-05-15 NOTE — PHYSICAL EXAM
[Well Developed] : well developed [Well Nourished] : well nourished [No Acute Distress] : no acute distress [Normal S1, S2] : normal S1, S2 [No Rub] : no rub [No Gallop] : no gallop [Normal] : clear lung fields, good air entry, no respiratory distress [Clear Lung Fields] : clear lung fields [Good Air Entry] : good air entry [No Respiratory Distress] : no respiratory distress  [de-identified] : 2/6 sandra cedeño

## 2023-05-15 NOTE — DISCUSSION/SUMMARY
[Patient] : the patient [___ Month(s)] : in [unfilled] month(s) [EKG obtained to assist in diagnosis and management of assessed problem(s)] : EKG obtained to assist in diagnosis and management of assessed problem(s) [FreeTextEntry1] : 68 y/o male with h/o hypothyroid, htn, hl, migraine, carotid disease who presents for f/up today\par \par -repeat carotid u/s ordered today\par -repeat echo for murmur, pr f/up\par -calcium score ordered \par -carotid 8/20: small plaque both carotid bulbs R>L, no HD significant stenosis\par -ekg ordered today - SB, normal intervals, no st/t changes\par -ETT: 7/18: normal\par -Echo 7/18: normal ef, no significant valvular disease\par -continue asa 81 mg qd\par -continue statin\par -continue ACE, norvasc 5 mg qd\par -labs 2022 reviewed \par -counseled on CVD risk factors \par -f/up 6 months for htn\par \par I have spent 30 minutes reviewing labs, records, tests and discussing htn, cvd risk factor control. \par \par

## 2023-05-16 LAB
ALBUMIN SERPL ELPH-MCNC: 4.5 G/DL
ALP BLD-CCNC: 73 U/L
ALT SERPL-CCNC: 23 U/L
ANION GAP SERPL CALC-SCNC: 13 MMOL/L
APPEARANCE: CLEAR
AST SERPL-CCNC: 28 U/L
BACTERIA: NEGATIVE /HPF
BASOPHILS # BLD AUTO: 0.03 K/UL
BASOPHILS NFR BLD AUTO: 0.5 %
BILIRUB SERPL-MCNC: 0.4 MG/DL
BILIRUBIN URINE: NEGATIVE
BLOOD URINE: NEGATIVE
BUN SERPL-MCNC: 17 MG/DL
CALCIUM SERPL-MCNC: 9.3 MG/DL
CAST: 0 /LPF
CHLORIDE SERPL-SCNC: 108 MMOL/L
CHOLEST SERPL-MCNC: 144 MG/DL
CO2 SERPL-SCNC: 22 MMOL/L
COLOR: NORMAL
CREAT SERPL-MCNC: 1.14 MG/DL
CREAT SPEC-SCNC: 250 MG/DL
EGFR: 70 ML/MIN/1.73M2
EOSINOPHIL # BLD AUTO: 0.1 K/UL
EOSINOPHIL NFR BLD AUTO: 1.8 %
EPITHELIAL CELLS: 0 /HPF
ESTIMATED AVERAGE GLUCOSE: 108 MG/DL
GLUCOSE QUALITATIVE U: NEGATIVE MG/DL
GLUCOSE SERPL-MCNC: 87 MG/DL
HBA1C MFR BLD HPLC: 5.4 %
HCT VFR BLD CALC: 36.1 %
HDLC SERPL-MCNC: 53 MG/DL
HGB BLD-MCNC: 11.8 G/DL
IMM GRANULOCYTES NFR BLD AUTO: 0.4 %
KETONES URINE: ABNORMAL MG/DL
LDLC SERPL CALC-MCNC: 78 MG/DL
LEUKOCYTE ESTERASE URINE: NEGATIVE
LYMPHOCYTES # BLD AUTO: 1.17 K/UL
LYMPHOCYTES NFR BLD AUTO: 20.7 %
MAGNESIUM SERPL-MCNC: 2.2 MG/DL
MAN DIFF?: NORMAL
MCHC RBC-ENTMCNC: 32.2 PG
MCHC RBC-ENTMCNC: 32.7 GM/DL
MCV RBC AUTO: 98.6 FL
MICROALBUMIN 24H UR DL<=1MG/L-MCNC: <1.2 MG/DL
MICROALBUMIN/CREAT 24H UR-RTO: NORMAL MG/G
MICROSCOPIC-UA: NORMAL
MONOCYTES # BLD AUTO: 0.57 K/UL
MONOCYTES NFR BLD AUTO: 10.1 %
NEUTROPHILS # BLD AUTO: 3.75 K/UL
NEUTROPHILS NFR BLD AUTO: 66.5 %
NITRITE URINE: NEGATIVE
NONHDLC SERPL-MCNC: 90 MG/DL
PH URINE: 6
PLATELET # BLD AUTO: 232 K/UL
POTASSIUM SERPL-SCNC: 4.7 MMOL/L
PROT SERPL-MCNC: 6.5 G/DL
PROTEIN URINE: NORMAL MG/DL
RBC # BLD: 3.66 M/UL
RBC # FLD: 13.3 %
RED BLOOD CELLS URINE: 1 /HPF
SODIUM SERPL-SCNC: 143 MMOL/L
SPECIFIC GRAVITY URINE: 1.03
TRIGL SERPL-MCNC: 60 MG/DL
TSH SERPL-ACNC: 1.53 UIU/ML
UROBILINOGEN URINE: 0.2 MG/DL
WBC # FLD AUTO: 5.64 K/UL
WHITE BLOOD CELLS URINE: 0 /HPF

## 2023-05-25 ENCOUNTER — OUTPATIENT (OUTPATIENT)
Dept: OUTPATIENT SERVICES | Facility: HOSPITAL | Age: 68
LOS: 1 days | End: 2023-05-25
Payer: MEDICARE

## 2023-05-25 ENCOUNTER — NON-APPOINTMENT (OUTPATIENT)
Age: 68
End: 2023-05-25

## 2023-05-25 ENCOUNTER — APPOINTMENT (OUTPATIENT)
Dept: ULTRASOUND IMAGING | Facility: HOSPITAL | Age: 68
End: 2023-05-25

## 2023-05-25 ENCOUNTER — RESULT REVIEW (OUTPATIENT)
Age: 68
End: 2023-05-25

## 2023-05-25 ENCOUNTER — APPOINTMENT (OUTPATIENT)
Dept: CT IMAGING | Facility: HOSPITAL | Age: 68
End: 2023-05-25

## 2023-05-25 DIAGNOSIS — R93.1 ABNORMAL FINDINGS ON DIAGNOSTIC IMAGING OF HEART AND CORONARY CIRCULATION: ICD-10-CM

## 2023-05-25 PROCEDURE — 93880 EXTRACRANIAL BILAT STUDY: CPT

## 2023-05-25 PROCEDURE — 75571 CT HRT W/O DYE W/CA TEST: CPT | Mod: 26,MH

## 2023-05-25 PROCEDURE — 93880 EXTRACRANIAL BILAT STUDY: CPT | Mod: 26

## 2023-05-25 PROCEDURE — 75571 CT HRT W/O DYE W/CA TEST: CPT

## 2023-05-31 DIAGNOSIS — I70.0 ATHEROSCLEROSIS OF AORTA: ICD-10-CM

## 2023-05-31 DIAGNOSIS — I25.10 ATHEROSCLEROTIC HEART DISEASE OF NATIVE CORONARY ARTERY WITHOUT ANGINA PECTORIS: ICD-10-CM

## 2023-05-31 DIAGNOSIS — I35.8 OTHER NONRHEUMATIC AORTIC VALVE DISORDERS: ICD-10-CM

## 2023-05-31 DIAGNOSIS — I34.81 NONRHEUMATIC MITRAL (VALVE) ANNULUS CALCIFICATION: ICD-10-CM

## 2023-05-31 DIAGNOSIS — I65.23 OCCLUSION AND STENOSIS OF BILATERAL CAROTID ARTERIES: ICD-10-CM

## 2023-05-31 DIAGNOSIS — Z01.810 ENCOUNTER FOR PREPROCEDURAL CARDIOVASCULAR EXAMINATION: ICD-10-CM

## 2023-06-06 ENCOUNTER — TRANSCRIPTION ENCOUNTER (OUTPATIENT)
Age: 68
End: 2023-06-06

## 2023-06-16 ENCOUNTER — APPOINTMENT (OUTPATIENT)
Dept: CT IMAGING | Facility: CLINIC | Age: 68
End: 2023-06-16
Payer: MEDICARE

## 2023-06-16 ENCOUNTER — RESULT REVIEW (OUTPATIENT)
Age: 68
End: 2023-06-16

## 2023-06-16 ENCOUNTER — OUTPATIENT (OUTPATIENT)
Dept: OUTPATIENT SERVICES | Facility: HOSPITAL | Age: 68
LOS: 1 days | End: 2023-06-16

## 2023-06-16 PROCEDURE — 75574 CT ANGIO HRT W/3D IMAGE: CPT | Mod: 26,MH

## 2023-06-16 PROCEDURE — 0504T: CPT

## 2023-06-22 DIAGNOSIS — I25.10 ATHEROSCLEROTIC HEART DISEASE OF NATIVE CORONARY ARTERY W/OUT ANGINA PECTORIS: ICD-10-CM

## 2023-07-07 ENCOUNTER — APPOINTMENT (OUTPATIENT)
Dept: HEART AND VASCULAR | Facility: CLINIC | Age: 68
End: 2023-07-07
Payer: MEDICARE

## 2023-07-07 VITALS
SYSTOLIC BLOOD PRESSURE: 117 MMHG | OXYGEN SATURATION: 95 % | HEIGHT: 72 IN | DIASTOLIC BLOOD PRESSURE: 73 MMHG | HEART RATE: 66 BPM

## 2023-07-07 DIAGNOSIS — R01.1 CARDIAC MURMUR, UNSPECIFIED: ICD-10-CM

## 2023-07-07 PROCEDURE — 93306 TTE W/DOPPLER COMPLETE: CPT

## 2023-07-10 PROBLEM — R01.1 MURMUR, CARDIAC: Status: ACTIVE | Noted: 2023-07-10

## 2023-08-07 ENCOUNTER — APPOINTMENT (OUTPATIENT)
Dept: HEART AND VASCULAR | Facility: CLINIC | Age: 68
End: 2023-08-07
Payer: MEDICARE

## 2023-08-07 VITALS
OXYGEN SATURATION: 95 % | BODY MASS INDEX: 26.14 KG/M2 | DIASTOLIC BLOOD PRESSURE: 74 MMHG | HEIGHT: 72 IN | HEART RATE: 72 BPM | SYSTOLIC BLOOD PRESSURE: 110 MMHG | TEMPERATURE: 97.2 F | WEIGHT: 193 LBS

## 2023-08-07 PROCEDURE — 36415 COLL VENOUS BLD VENIPUNCTURE: CPT

## 2023-08-07 PROCEDURE — 99214 OFFICE O/P EST MOD 30 MIN: CPT | Mod: 25

## 2023-08-07 NOTE — HISTORY OF PRESENT ILLNESS
[FreeTextEntry1] : 66 y/o male with h/o cad, hypothyroid, htn, hl, migraine, carotid disease who presents for f/up today.    Last seen  sent for carotid u/s, echo, calcium, CTA  -Calcium score : 1389 (92%), mildly dilated ao root 4.1 cm and mid ascending thoracic aorta 4.2 cm, mac  -Carotid u/s ; mild bilateral calcified bulbar plaque  -Echo : ef 67%, no wma, mild lvh, trace mr/tr/pr, mild pulm htn pasp 39, borderline dilated prox asc 4.1 cm aorta     -CTA cor : calcium score 1706, mild prox LAD, mod mid LAD, mild dLAD, mild D2, minimal prox Lcx, mild mid LCx, mild OM2, minimal pRCA, mild mRCA, minimal dRCA, minimal RPDA, mac, hiatal hernia, FFR 1    -started zetia    No cp, sob, syncope, edema, palpitation, orthopnea, pnd    carotid : small plaque both carotid bulbs R>L, no HD significant stenosis    : ETT and Echo which were normal    carotid u/s : mild calcific plaque right carotid bifurcation/right prox ICA    On statin since     Exercises with ellipitical 2 hours - 4-5 times/week  Eats healthy  Low stress  7 hours sleep  No mental health issues    Food allergies to milk/soy        PMH/PSH: cad  hypothyroid  seasonal allergies  gerd  htn  hl  brissa  migraine  alfred neuroma surgery right foot 2009  uvelectomy for frank           ALL:  ppi - rash        SH:  no tobacco  1 glass wine 3-4 times/week  no drugs  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

## 2023-08-07 NOTE — DISCUSSION/SUMMARY
[Patient] : the patient [___ Month(s)] : in [unfilled] month(s) [FreeTextEntry1] : 66 y/o male with h/o cad, hypothyroid, htn, hl, migraine, carotid disease who presents for f/up today  -Calcium score 2023: 1389 (92%), mildly dilated ao root 4.1 cm and mid ascending thoracic aorta 4.2 cm, mac  -Carotid u/s 2023; mild bilateral calcified bulbar plaque  -Echo 7/23: ef 67%, no wma, mild lvh, trace mr/tr/pr, mild pulm htn pasp 39, borderline dilated prox asc 4.1 cm aorta   -refer to Dr. Alcaraz for asc ao  -ordered nuclear stress test  -start zetia   -CTA cor 6/23: calcium score 1706, mild prox LAD, mod mid LAD, mild dLAD, mild D2, minimal prox Lcx, mild mid LCx, mild OM2, minimal pRCA, mild mRCA, minimal dRCA, minimal RPDA, mac, hiatal hernia, FFR 1   -ordered lipids today   -carotid 8/20: small plaque both carotid bulbs R>L, no HD significant stenosis  -ekg 5/23 - SB, normal intervals, no st/t changes  -ETT: 7/18: normal  -Echo 7/18: normal ef, no significant valvular disease  -continue asa 81 mg qd  -continue statin, zetia  -continue ACE, norvasc 5 mg qd  -labs 2023 reviewed  -counseled on CVD risk factors  -f/up 6 months for htn, cad    I have spent 30 minutes reviewing labs, records, tests and discussing htn, cvd risk factor control.

## 2023-08-08 LAB
CHOLEST SERPL-MCNC: 117 MG/DL
HDLC SERPL-MCNC: 47 MG/DL
LDLC SERPL CALC-MCNC: 56 MG/DL
NONHDLC SERPL-MCNC: 69 MG/DL
TRIGL SERPL-MCNC: 61 MG/DL

## 2023-08-10 RX ORDER — ATORVASTATIN CALCIUM 80 MG/1
80 TABLET, FILM COATED ORAL
Qty: 90 | Refills: 3 | Status: ACTIVE | COMMUNITY
Start: 2018-06-25 | End: 1900-01-01

## 2023-08-17 ENCOUNTER — RESULT REVIEW (OUTPATIENT)
Age: 68
End: 2023-08-17

## 2023-08-17 ENCOUNTER — OUTPATIENT (OUTPATIENT)
Dept: OUTPATIENT SERVICES | Facility: HOSPITAL | Age: 68
LOS: 1 days | End: 2023-08-17
Payer: MEDICARE

## 2023-08-17 DIAGNOSIS — I37.1 NONRHEUMATIC PULMONARY VALVE INSUFFICIENCY: ICD-10-CM

## 2023-08-17 PROCEDURE — 78452 HT MUSCLE IMAGE SPECT MULT: CPT

## 2023-08-17 PROCEDURE — 93018 CV STRESS TEST I&R ONLY: CPT

## 2023-08-17 PROCEDURE — 93016 CV STRESS TEST SUPVJ ONLY: CPT

## 2023-08-17 PROCEDURE — 93017 CV STRESS TEST TRACING ONLY: CPT

## 2023-08-17 PROCEDURE — A9500: CPT

## 2023-08-17 PROCEDURE — 78452 HT MUSCLE IMAGE SPECT MULT: CPT | Mod: 26,MH

## 2023-08-19 ENCOUNTER — NON-APPOINTMENT (OUTPATIENT)
Age: 68
End: 2023-08-19

## 2023-08-30 ENCOUNTER — NON-APPOINTMENT (OUTPATIENT)
Age: 68
End: 2023-08-30

## 2023-08-30 ENCOUNTER — APPOINTMENT (OUTPATIENT)
Dept: CARDIOTHORACIC SURGERY | Facility: CLINIC | Age: 68
End: 2023-08-30
Payer: MEDICARE

## 2023-08-30 VITALS
BODY MASS INDEX: 26.01 KG/M2 | TEMPERATURE: 97.3 F | OXYGEN SATURATION: 96 % | HEART RATE: 65 BPM | RESPIRATION RATE: 14 BRPM | WEIGHT: 192 LBS | SYSTOLIC BLOOD PRESSURE: 111 MMHG | DIASTOLIC BLOOD PRESSURE: 64 MMHG | HEIGHT: 72 IN

## 2023-08-30 VITALS
HEART RATE: 65 BPM | DIASTOLIC BLOOD PRESSURE: 64 MMHG | OXYGEN SATURATION: 96 % | RESPIRATION RATE: 14 BRPM | TEMPERATURE: 97.3 F | HEIGHT: 72 IN | SYSTOLIC BLOOD PRESSURE: 111 MMHG | WEIGHT: 192 LBS | BODY MASS INDEX: 26.01 KG/M2

## 2023-08-30 DIAGNOSIS — I77.810 THORACIC AORTIC ECTASIA: ICD-10-CM

## 2023-08-30 PROCEDURE — 99204 OFFICE O/P NEW MOD 45 MIN: CPT

## 2023-08-31 PROBLEM — I77.810 ASCENDING AORTA DILATATION: Status: ACTIVE | Noted: 2023-08-31

## 2023-09-03 NOTE — PROCEDURE
[FreeTextEntry1] : Dr. lAcaraz reviewed the indications for surgery, and used our webpage [email or web address] <[email or web address]> to illustrate the aorta and anatomy of the heart. Those indications are the following: size greater than 5.0 cm, symptomatic aneurysms, family history of aortic dissection or aneurysm death with a size greater than 4.5 cm, other necessary cardiac procedures such as coronary artery bypass grafting or valvular disorders with an aneurysm greater than 4.5 cm, or connective tissue disorders with an aneurysm size greater than 4.5 cm. The patient does not meet size criteria for surgical intervention at the time.  Dr. Alcaraz discussed activity restrictions with the patient, and would advise exercise at a moderate amount with no heavy lifting over one third of body weight, and avoiding heart rates that exceed 140 beats per minute. In addition, every patient should abstain from tobacco abuse and to avoid all illicit drug use, especially stimulants such as cocaine or methamphetamine. Dr. Alcaraz also counseled regarding maintaining a healthy heart diet, and losing any excessive weight as this also put undue stress on both the aorta and entire cardiovascular system. First degree family members should be screened for bicuspid valve disease, and ascending aortic aneurysms.   Patient was advised to view the educational video prior to this visit regarding aortic pathology, risk factors, surgical procedures, and lifestyle modifications. Video can be retrieved at [email or web address].

## 2023-09-03 NOTE — END OF VISIT
[Time Spent: ___ minutes] : I have spent [unfilled] minutes of time on the encounter. [FreeTextEntry3] : I, ROSA AMANDA , am scribing for and in the presence of CARRINGTON RUFFIN the following sections: History of present illness, past Medical/family/surgical/family/social history, review of systems, vital signs, physical exam and disposition.  I personally performed the services described in the documentation, reviewed the documentation recorded by the scribe in my presence and it accurately and completely records my words and actions.

## 2023-09-03 NOTE — PHYSICAL EXAM
[General Appearance - Alert] : alert [General Appearance - In No Acute Distress] : in no acute distress [Sclera] : the sclera and conjunctiva were normal [Outer Ear] : the ears and nose were normal in appearance [Both Tympanic Membranes Were Examined] : both tympanic membranes were normal [Neck Appearance] : the appearance of the neck was normal [] : no respiratory distress [Respiration, Rhythm And Depth] : normal respiratory rhythm and effort [Heart Sounds] : normal S1 and S2 [Heart Rate And Rhythm] : heart rate was normal and rhythm regular [Examination Of The Chest] : the chest was normal in appearance [2+] : left 2+ [No Abnormalities] : the abdominal aorta was not enlarged and no bruit was heard [Abdomen Soft] : soft [Bowel Sounds] : normal bowel sounds [No CVA Tenderness] : no ~M costovertebral angle tenderness [Skin Color & Pigmentation] : normal skin color and pigmentation [Abnormal Walk] : normal gait [No Focal Deficits] : no focal deficits [Oriented To Time, Place, And Person] : oriented to person, place, and time [FreeTextEntry1] : deferred

## 2023-09-03 NOTE — DATA REVIEWED
[FreeTextEntry1] : -Calcium score 2023: 1389 (92%), mildly dilated ao root 4.1 cm and mid ascending thoracic aorta 4.2 cm, mac -Carotid u/s 2023; mild bilateral calcified bulbar plaque -Echo 7/23: ef 67%, no wma, mild lvh, trace mr/tr/pr, mild pulm htn pasp 39, borderline dilated prox asc 4.1 cm aorta

## 2023-09-03 NOTE — ASSESSMENT
[FreeTextEntry1] : 69 y/o male with h/o cad, hypothyroid, htn, hl, migraine, carotid disease, presents for evaluation and management of dilated ascending aorta.   The patient's medical records and diagnostic images were reviewed at the time of this office visit, and the following recommendation was made. CT and TTE were completed of the thoracic aorta. The reports were reviewed and noted in the chart, I independently reviewed and interpreted images and reports and I reviewed the films/CD and agree. The patient does not meet the criteria for surgical intervention at the time and will be entered into the aortic registry surveillance program.  Plan  - [facility] with [facility]A chest. - Continue medication regimen. - Follow up with cardiologist and PCP. - Blood pressure management. - Discussed signs and symptoms that warrant emergency medical attention.
No

## 2023-09-03 NOTE — HISTORY OF PRESENT ILLNESS
[FreeTextEntry1] : 69 y/o male with h/o cad, hypothyroid, htn, hl, migraine, carotid disease, presents for evaluation and management of dilated ascending aorta.   CTA cor 6/16/23: calcium score 1706, mild prox LAD, mod mid LAD, mild dLAD, mild D2, minimal prox Lcx, mild mid LCx, mild OM2, minimal pRCA, mild mRCA, minimal dRCA, minimal RPDA, mac, hiatal hernia, FFR 1 ascending aorta measures 4.0 cm at the main pulmonary artery  TTE 7/7/23 1. Normal left ventricular cavity size. The left ventricular systolic function is normal with an ejection fraction of 67 % by Polo's method of disks. There are no regional wall motion abnormalities seen. 2. There is normal left ventricular diastolic function. 3. Normal right ventricular cavity size, normal right ventricular wall thickness and normal right ventricular systolic function. 4. Mild left ventricular hypertrophy. 5. No significant valvular disease. 6. No pericardial effusion seen. 7. Mild pulmonary hypertension.

## 2023-12-04 ENCOUNTER — TRANSCRIPTION ENCOUNTER (OUTPATIENT)
Age: 68
End: 2023-12-04

## 2023-12-04 RX ORDER — LISINOPRIL 40 MG/1
40 TABLET ORAL
Qty: 90 | Refills: 3 | Status: ACTIVE | COMMUNITY
Start: 2020-10-15 | End: 1900-01-01

## 2024-01-03 ENCOUNTER — APPOINTMENT (OUTPATIENT)
Dept: HEART AND VASCULAR | Facility: CLINIC | Age: 69
End: 2024-01-03
Payer: MEDICARE

## 2024-01-03 VITALS
WEIGHT: 192 LBS | SYSTOLIC BLOOD PRESSURE: 108 MMHG | HEIGHT: 72 IN | HEART RATE: 61 BPM | BODY MASS INDEX: 26.01 KG/M2 | OXYGEN SATURATION: 98 % | DIASTOLIC BLOOD PRESSURE: 78 MMHG | TEMPERATURE: 98 F

## 2024-01-03 PROCEDURE — 93000 ELECTROCARDIOGRAM COMPLETE: CPT

## 2024-01-03 PROCEDURE — 99214 OFFICE O/P EST MOD 30 MIN: CPT | Mod: 25

## 2024-01-03 NOTE — HISTORY OF PRESENT ILLNESS
[FreeTextEntry1] : 69 y/o male with h/o cad, hypothyroid, htn, hl, migraine, carotid disease who presents for f/up today.    Last seen    No cp, sob, syncope, edema, palpitation, orthopnea, pnd  -Nuclear stress : normal, enlarged RV  seen by Dr. Alcaraz for aorta - plan to f/up  with repeat imaging CTA aorta  -Calcium score : 1389 (92%), mildly dilated ao root 4.1 cm and mid ascending thoracic aorta 4.2 cm, mac  -Carotid u/s ; mild bilateral calcified bulbar plaque  -Echo : ef 67%, no wma, mild lvh, trace mr/tr/pr, mild pulm htn pasp 39, borderline dilated prox asc 4.1 cm aorta   -CTA cor : calcium score 1706, mild prox LAD, mod mid LAD, mild dLAD, mild D2, minimal prox Lcx, mild mid LCx, mild OM2, minimal pRCA, mild mRCA, minimal dRCA, minimal RPDA, mac, hiatal hernia, FFR 1  -started zetia      carotid : small plaque both carotid bulbs R>L, no HD significant stenosis    : ETT and Echo which were normal    carotid u/s : mild calcific plaque right carotid bifurcation/right prox ICA    On statin since     Exercises with ellipitical 2 hours - 4-5 times/week  Eats healthy  Low stress  7 hours sleep  No mental health issues    Food allergies to milk/soy        PMH/PSH: cad  hypothyroid  seasonal allergies  gerd  htn  hl  brissa  migraine  alfred neuroma surgery right foot 2009  uvelectomy for frank           ALL:  ppi - rash        SH:  no tobacco  1 glass wine 3-4 times/week  no drugs  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

## 2024-01-03 NOTE — DISCUSSION/SUMMARY
[Patient] : the patient [___ Month(s)] : in [unfilled] month(s) [EKG obtained to assist in diagnosis and management of assessed problem(s)] : EKG obtained to assist in diagnosis and management of assessed problem(s) [FreeTextEntry1] : 67 y/o male with h/o cad, hypothyroid, htn, hl, migraine, carotid disease who presents for f/up today  -Calcium score 2023: 1389 (92%), mildly dilated ao root 4.1 cm and mid ascending thoracic aorta 4.2 cm, mac  -Carotid u/s 2023; mild bilateral calcified bulbar plaque  -Echo 7/23: ef 67%, no wma, mild lvh, trace mr/tr/pr, mild pulm htn pasp 39, borderline dilated prox asc 4.1 cm aorta, normal rv size/fxn  -f/up with Dr. Alcaraz for asc ao - f/up 8/24 with repeat imaging  -Nuclear stress 8/23: normal, enlarged RV  -CTA cor 6/23: calcium score 1706, mild prox LAD, mod mid LAD, mild dLAD, mild D2, minimal prox Lcx, mild mid LCx, mild OM2, minimal pRCA, mild mRCA, minimal dRCA, minimal RPDA, mac, hiatal hernia, FFR 1  -carotid 8/20: small plaque both carotid bulbs R>L, no HD significant stenosis  -ekg ordered today - SB, rvcd, no st/t changes  -ETT: 7/18: normal  -Echo 7/18: normal ef, no significant valvular disease  -continue asa 81 mg qd  -continue statin, zetia  -continue ACE, norvasc 5 mg qd  -labs 2023 reviewed  -counseled on CVD risk factors  -f/up 6 months for htn, cad    I have spent 30 minutes reviewing labs, records, tests and discussing htn, cvd risk factor control.

## 2024-01-11 ENCOUNTER — NON-APPOINTMENT (OUTPATIENT)
Age: 69
End: 2024-01-11

## 2024-01-12 ENCOUNTER — TRANSCRIPTION ENCOUNTER (OUTPATIENT)
Age: 69
End: 2024-01-12

## 2024-01-12 RX ORDER — EZETIMIBE 10 MG/1
10 TABLET ORAL
Qty: 90 | Refills: 1 | Status: ACTIVE | COMMUNITY
Start: 2023-06-22 | End: 1900-01-01

## 2024-04-19 ENCOUNTER — TRANSCRIPTION ENCOUNTER (OUTPATIENT)
Age: 69
End: 2024-04-19

## 2024-04-19 RX ORDER — AMLODIPINE BESYLATE 5 MG/1
5 TABLET ORAL
Qty: 90 | Refills: 3 | Status: ACTIVE | COMMUNITY
Start: 2020-11-05 | End: 1900-01-01

## 2024-06-19 ENCOUNTER — APPOINTMENT (OUTPATIENT)
Dept: HEART AND VASCULAR | Facility: CLINIC | Age: 69
End: 2024-06-19
Payer: MEDICARE

## 2024-06-19 VITALS
BODY MASS INDEX: 25.6 KG/M2 | HEIGHT: 72 IN | SYSTOLIC BLOOD PRESSURE: 103 MMHG | TEMPERATURE: 97.8 F | DIASTOLIC BLOOD PRESSURE: 64 MMHG | WEIGHT: 189 LBS | HEART RATE: 65 BPM | OXYGEN SATURATION: 95 %

## 2024-06-19 PROCEDURE — 99214 OFFICE O/P EST MOD 30 MIN: CPT

## 2024-06-19 PROCEDURE — 36415 COLL VENOUS BLD VENIPUNCTURE: CPT

## 2024-06-19 PROCEDURE — 93000 ELECTROCARDIOGRAM COMPLETE: CPT

## 2024-06-19 PROCEDURE — G2211 COMPLEX E/M VISIT ADD ON: CPT

## 2024-06-19 NOTE — HISTORY OF PRESENT ILLNESS
[FreeTextEntry1] : 67 y/o male with h/o cad, hypothyroid, htn, hl, migraine, carotid disease who presents for f/up today.    Last seen   No cp, sob, syncope, edema, palpitation, orthopnea, pnd  -Nuclear stress : normal, enlarged RV  seen by Dr. Alcaraz for aorta - plan to f/up  with repeat imaging CTA aorta  -Calcium score : 1389 (92%), mildly dilated ao root 4.1 cm and mid ascending thoracic aorta 4.2 cm, mac  -Carotid u/s ; mild bilateral calcified bulbar plaque  -Echo : ef 67%, no wma, mild lvh, trace mr/tr/pr, mild pulm htn pasp 39, borderline dilated prox asc 4.1 cm aorta   -CTA cor : calcium score 1706, mild prox LAD, mod mid LAD, mild dLAD, mild D2, minimal prox Lcx, mild mid LCx, mild OM2, minimal pRCA, mild mRCA, minimal dRCA, minimal RPDA, mac, hiatal hernia, FFR 1        carotid : small plaque both carotid bulbs R>L, no HD significant stenosis    : ETT and Echo which were normal    carotid u/s : mild calcific plaque right carotid bifurcation/right prox ICA    On statin since     Exercises with ellipitical 2 hours - 4-5 times/week  Eats healthy  Low stress  7 hours sleep  No mental health issues    Food allergies to milk/soy        PMH/PSH: cad  hypothyroid  seasonal allergies  gerd  htn  hl  brissa  migraine  alfred neuroma surgery right foot 2009  uvelectomy for frank           ALL:  ppi - rash        SH:  no tobacco  1 glass wine 3-4 times/week  no drugs  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

## 2024-06-19 NOTE — DISCUSSION/SUMMARY
[Patient] : the patient [___ Month(s)] : in [unfilled] month(s) [FreeTextEntry1] : 69 y/o male with h/o cad, hypothyroid, htn, hl, migraine, carotid disease who presents for f/up today  -Calcium score 2023: 1389 (92%), mildly dilated ao root 4.1 cm and mid ascending thoracic aorta 4.2 cm, mac  -Carotid u/s 2023; mild bilateral calcified bulbar plaque  -Echo 7/23: ef 67%, no wma, mild lvh, trace mr/tr/pr, mild pulm htn pasp 39, borderline dilated prox asc 4.1 cm aorta, normal rv size/fxn  -f/up with Dr. Alcaraz for asc ao - f/up 8/24 with repeat imaging  -Nuclear stress 8/23: normal, enlarged RV  -CTA cor 6/23: calcium score 1706, mild prox LAD, mod mid LAD, mild dLAD, mild D2, minimal prox Lcx, mild mid LCx, mild OM2, minimal pRCA, mild mRCA, minimal dRCA, minimal RPDA, mac, hiatal hernia, FFR 1  -carotid 8/20: small plaque both carotid bulbs R>L, no HD significant stenosis  -ekg ordered today - SB, rvcd, no st/t changes  -ETT: 7/18: normal  -Echo 7/18: normal ef, no significant valvular disease  -continue asa 81 mg qd  -continue statin, zetia  -continue ACE, norvasc 5 mg qd  -labs 2023 reviewed, labs ordered today  -counseled on CVD risk factors  -f/up 6 months for htn, cad    I have spent 30 minutes reviewing labs, records, tests and discussing htn, cvd risk factor control. [EKG obtained to assist in diagnosis and management of assessed problem(s)] : EKG obtained to assist in diagnosis and management of assessed problem(s)

## 2024-06-20 LAB
ALBUMIN SERPL ELPH-MCNC: 4.4 G/DL
ALP BLD-CCNC: 71 U/L
ALT SERPL-CCNC: 23 U/L
ANION GAP SERPL CALC-SCNC: 13 MMOL/L
AST SERPL-CCNC: 28 U/L
BASOPHILS # BLD AUTO: 0.04 K/UL
BASOPHILS NFR BLD AUTO: 0.7 %
BILIRUB SERPL-MCNC: 0.6 MG/DL
BUN SERPL-MCNC: 19 MG/DL
CALCIUM SERPL-MCNC: 9.6 MG/DL
CHLORIDE SERPL-SCNC: 107 MMOL/L
CHOLEST SERPL-MCNC: 130 MG/DL
CO2 SERPL-SCNC: 21 MMOL/L
CREAT SERPL-MCNC: 1.22 MG/DL
CREAT SPEC-SCNC: 170 MG/DL
EGFR: 64 ML/MIN/1.73M2
EOSINOPHIL # BLD AUTO: 0.11 K/UL
EOSINOPHIL NFR BLD AUTO: 1.8 %
ESTIMATED AVERAGE GLUCOSE: 103 MG/DL
GLUCOSE SERPL-MCNC: 91 MG/DL
HBA1C MFR BLD HPLC: 5.2 %
HCT VFR BLD CALC: 43.1 %
HDLC SERPL-MCNC: 60 MG/DL
HGB BLD-MCNC: 13.5 G/DL
IMM GRANULOCYTES NFR BLD AUTO: 0.3 %
LDLC SERPL CALC-MCNC: 57 MG/DL
LYMPHOCYTES # BLD AUTO: 1.37 K/UL
LYMPHOCYTES NFR BLD AUTO: 22.6 %
MAGNESIUM SERPL-MCNC: 2.3 MG/DL
MAN DIFF?: NORMAL
MCHC RBC-ENTMCNC: 31 PG
MCHC RBC-ENTMCNC: 31.3 GM/DL
MCV RBC AUTO: 99.1 FL
MICROALBUMIN 24H UR DL<=1MG/L-MCNC: <1.2 MG/DL
MICROALBUMIN/CREAT 24H UR-RTO: NORMAL MG/G
MONOCYTES # BLD AUTO: 0.7 K/UL
MONOCYTES NFR BLD AUTO: 11.5 %
NEUTROPHILS # BLD AUTO: 3.83 K/UL
NEUTROPHILS NFR BLD AUTO: 63.1 %
NONHDLC SERPL-MCNC: 70 MG/DL
PLATELET # BLD AUTO: 253 K/UL
POTASSIUM SERPL-SCNC: 4.6 MMOL/L
PROT SERPL-MCNC: 6.7 G/DL
RBC # BLD: 4.35 M/UL
RBC # FLD: 13.7 %
SODIUM SERPL-SCNC: 140 MMOL/L
TRIGL SERPL-MCNC: 60 MG/DL
TSH SERPL-ACNC: 1.3 UIU/ML
WBC # FLD AUTO: 6.07 K/UL

## 2024-06-22 LAB — APO LP(A) SERPL-MCNC: 50.2 NMOL/L

## 2024-07-16 ENCOUNTER — TRANSCRIPTION ENCOUNTER (OUTPATIENT)
Age: 69
End: 2024-07-16

## 2024-08-05 ENCOUNTER — TRANSCRIPTION ENCOUNTER (OUTPATIENT)
Age: 69
End: 2024-08-05

## 2024-08-22 ENCOUNTER — OUTPATIENT (OUTPATIENT)
Dept: OUTPATIENT SERVICES | Facility: HOSPITAL | Age: 69
LOS: 1 days | End: 2024-08-22

## 2024-08-22 ENCOUNTER — APPOINTMENT (OUTPATIENT)
Dept: CT IMAGING | Facility: CLINIC | Age: 69
End: 2024-08-22
Payer: MEDICARE

## 2024-08-22 PROCEDURE — 71275 CT ANGIOGRAPHY CHEST: CPT | Mod: 26,MH

## 2024-08-27 ENCOUNTER — NON-APPOINTMENT (OUTPATIENT)
Age: 69
End: 2024-08-27

## 2024-08-28 ENCOUNTER — APPOINTMENT (OUTPATIENT)
Dept: CARDIOTHORACIC SURGERY | Facility: CLINIC | Age: 69
End: 2024-08-28

## 2024-08-28 ENCOUNTER — APPOINTMENT (OUTPATIENT)
Dept: CARDIOTHORACIC SURGERY | Facility: CLINIC | Age: 69
End: 2024-08-28
Payer: MEDICARE

## 2024-08-28 VITALS
TEMPERATURE: 97.4 F | OXYGEN SATURATION: 97 % | WEIGHT: 194 LBS | BODY MASS INDEX: 26.28 KG/M2 | HEART RATE: 64 BPM | DIASTOLIC BLOOD PRESSURE: 71 MMHG | HEIGHT: 72 IN | SYSTOLIC BLOOD PRESSURE: 109 MMHG

## 2024-08-28 DIAGNOSIS — Z82.79 FAMILY HISTORY OF OTHER CONGENITAL MALFORMATIONS, DEFORMATIONS AND CHROMOSOMAL ABNORMALITIES: ICD-10-CM

## 2024-08-28 DIAGNOSIS — I77.810 THORACIC AORTIC ECTASIA: ICD-10-CM

## 2024-08-28 PROCEDURE — 99213 OFFICE O/P EST LOW 20 MIN: CPT

## 2024-08-29 PROBLEM — Z82.79 FAMILY HISTORY OF MARFAN SYNDROME: Status: ACTIVE | Noted: 2024-08-29

## 2024-09-02 NOTE — PROCEDURE
[FreeTextEntry1] : Dr. Alcaraz reviewed the indications for surgery, and used our webpage [email or web address] <[email or web address]> to illustrate the aorta and anatomy of the heart. Those indications are the following: size greater than 5.0 cm, symptomatic aneurysms, family history of aortic dissection or aneurysm death with a size greater than 4.5 cm, other necessary cardiac procedures such as coronary artery bypass grafting or valvular disorders with an aneurysm greater than 4.5 cm, or connective tissue disorders with an aneurysm size greater than 4.5 cm. The patient does not meet size criteria for surgical intervention at the time.  Dr. Alcaraz discussed activity restrictions with the patient, and would advise exercise at a moderate amount with no heavy lifting over one third of body weight, and avoiding heart rates that exceed 140 beats per minute. In addition, every patient should abstain from tobacco abuse and to avoid all illicit drug use, especially stimulants such as cocaine or methamphetamine. Dr. Alcaraz also counseled regarding maintaining a healthy heart diet, and losing any excessive weight as this also put undue stress on both the aorta and entire cardiovascular system. First degree family members should be screened for bicuspid valve disease, and ascending aortic aneurysms.   Patient was advised to view the educational video prior to this visit regarding aortic pathology, risk factors, surgical procedures, and lifestyle modifications. Video can be retrieved at [email or web address].

## 2024-09-02 NOTE — PROCEDURE
[FreeTextEntry1] : Dr. Alcaraz reviewed the indications for surgery, and used our webpage [email or web address] <[email or web address]> to illustrate the aorta and anatomy of the heart. Those indications are the following: size greater than 5.0 cm, symptomatic aneurysms, family history of aortic dissection or aneurysm death with a size greater than 4.5 cm, other necessary cardiac procedures such as coronary artery bypass grafting or valvular disorders with an aneurysm greater than 4.5 cm, or connective tissue disorders with an aneurysm size greater than 4.5 cm. The patient does not meet size criteria for surgical intervention at the time.  Dr. Alcaraz discussed activity restrictions with the patient, and would advise exercise at a moderate amount with no heavy lifting over one third of body weight, and avoiding heart rates that exceed 140 beats per minute. In addition, every patient should abstain from tobacco abuse and to avoid all illicit drug use, especially stimulants such as cocaine or methamphetamine. Dr. Alcaraz also counseled regarding maintaining a healthy heart diet, and losing any excessive weight as this also put undue stress on both the aorta and entire cardiovascular system. First degree family members should be screened for bicuspid valve disease, and ascending aortic aneurysms.   Patient was advised to view the educational video prior to this visit regarding aortic pathology, risk factors, surgical procedures, and lifestyle modifications. Video can be retrieved at [email or web address].

## 2024-09-02 NOTE — HISTORY OF PRESENT ILLNESS
[FreeTextEntry1] : 70 y/o male with PMHx of HTN, HLD, CAD, Hypothyroid, carotid disease, migraine HA who presents for evaluation and management of dilated ascending aorta. Pt is under the care of Dr. Brenner. Family hx of Marfan syndrome (daughter).   CTA chest 8/22/24: Ascending thoracic aorta measures maximally 4.0 cm, stable since 5/2023; the ascending thoracic aorta just distal to the sinuses of Valsalva measures 3.2 cm; ascending thoracic aorta at the level of the main pulmonary artery measures 3.9 cm; the aortic arch just distal to the left subclavian artery measures 2.3 cm; the mid descending thoracic aorta at the level of the main pulmonary artery measures 2.3 cm; distal descending thoracic aorta at the level of diaphragmatic hiatus measures 2.5 cm. The celiac artery, superior mesenteric artery and visualized bilateral renal arteries are widely patent.

## 2024-09-02 NOTE — END OF VISIT
[Time Spent: ___ minutes] : I have spent [unfilled] minutes of time on the encounter which excludes teaching and separately reported services. [FreeTextEntry3] : I, CARRINGTON Herr personally performed the evaluation and management (E/M) services for this established patient who presents today with (a) new problem(s)/exacerbation of (an) existing condition(s).  That E/M includes conducting the clinically appropriate interval history &/or exam, assessing all new/exacerbated conditions, and establishing a new plan of care.  Today, my JING, was here to observe &/or participate in the visit & follow plan of care established by me.

## 2024-09-02 NOTE — ASSESSMENT
[FreeTextEntry1] : I have reviewed the patient's medical records, diagnostic images during the time of this office consultation and have made the following recommendation. CT was completed of the thoracic aorta. The report was reviewed and noted in the chart, I independently reviewed and interpreted images and reports and I reviewed the films/CD and agree.  Review of the imaging shows his aortic pathology has remained stable and does not require surgical intervention.   - [facility] up [facility] one year with CTA chest. - Continue medication regimen. - Follow up with cardiologist and PCP. - Blood pressure management. - Discussed signs and symptoms that warrant emergency medical attention.

## 2024-09-02 NOTE — PROCEDURE
[FreeTextEntry1] : Dr. Alcaraz reviewed the indications for surgery, and used our webpage [email or web address] <[email or web address]> to illustrate the aorta and anatomy of the heart. Those indications are the following: size greater than 5.0 cm, symptomatic aneurysms, family history of aortic dissection or aneurysm death with a size greater than 4.5 cm, other necessary cardiac procedures such as coronary artery bypass grafting or valvular disorders with an aneurysm greater than 4.5 cm, or connective tissue disorders with an aneurysm size greater than 4.5 cm. The patient does not meet size criteria for surgical intervention at the time.  Dr. Alcaraz discussed activity restrictions with the patient, and would advise exercise at a moderate amount with no heavy lifting over one third of body weight, and avoiding heart rates that exceed 140 beats per minute. In addition, every patient should abstain from tobacco abuse and to avoid all illicit drug use, especially stimulants such as cocaine or methamphetamine. Dr. Alcaraz also counseled regarding maintaining a healthy heart diet, and losing any excessive weight as this also put undue stress on both the aorta and entire cardiovascular system. First degree family members should be screened for bicuspid valve disease, and ascending aortic aneurysms.   Patient was advised to view the educational video prior to this visit regarding aortic pathology, risk factors, surgical procedures, and lifestyle modifications. Video can be retrieved at [email or web address].be.

## 2024-09-02 NOTE — DATA REVIEWED
[FreeTextEntry1] : -Carotid u/s 2023; mild bilateral calcified bulbar plaque  CTA cor 6/16/23: calcium score 1706, mild prox LAD, mod mid LAD, mild dLAD, mild D2, minimal prox Lcx, mild mid LCx, mild OM2, minimal pRCA, mild mRCA, minimal dRCA, minimal RPDA, mac, hiatal hernia, FFR 1 ascending aorta measures 4.0 cm at the main pulmonary artery  TTE 7/7/23 1. Normal left ventricular cavity size. The left ventricular systolic function is normal with an ejection fraction of 67 % by Polo's method of disks. There are no regional wall motion abnormalities seen. 2. There is normal left ventricular diastolic function. 3. Normal right ventricular cavity size, normal right ventricular wall thickness and normal right ventricular systolic function. 4. Mild left ventricular hypertrophy. 5. No significant valvular disease. 6. No pericardial effusion seen. 7. Mild pulmonary hypertension.

## 2024-09-02 NOTE — HISTORY OF PRESENT ILLNESS
[FreeTextEntry1] : 68 y/o male with PMHx of HTN, HLD, CAD, Hypothyroid, carotid disease, migraine HA who presents for evaluation and management of dilated ascending aorta. Pt is under the care of Dr. Brenner. Family hx of Marfan syndrome (daughter).   CTA chest 8/22/24: Ascending thoracic aorta measures maximally 4.0 cm, stable since 5/2023; the ascending thoracic aorta just distal to the sinuses of [place] measures 3.2 cm; ascending thoracic aorta at the level of the main pulmonary artery measures 3.9 cm; the aortic arch just distal to the left subclavian artery measures 2.3 cm; the mid descending thoracic aorta at the level of the main pulmonary artery measures 2.3 cm; distal descending thoracic aorta at the level of diaphragmatic hiatus measures 2.5 cm. The celiac artery, superior mesenteric artery and visualized bilateral renal arteries are widely patent.

## 2024-11-26 ENCOUNTER — TRANSCRIPTION ENCOUNTER (OUTPATIENT)
Age: 69
End: 2024-11-26

## 2025-01-07 ENCOUNTER — TRANSCRIPTION ENCOUNTER (OUTPATIENT)
Age: 70
End: 2025-01-07

## 2025-02-26 ENCOUNTER — NON-APPOINTMENT (OUTPATIENT)
Age: 70
End: 2025-02-26

## 2025-02-26 ENCOUNTER — APPOINTMENT (OUTPATIENT)
Dept: HEART AND VASCULAR | Facility: CLINIC | Age: 70
End: 2025-02-26
Payer: MEDICARE

## 2025-02-26 VITALS
HEART RATE: 78 BPM | BODY MASS INDEX: 26.28 KG/M2 | SYSTOLIC BLOOD PRESSURE: 109 MMHG | DIASTOLIC BLOOD PRESSURE: 65 MMHG | TEMPERATURE: 97.7 F | WEIGHT: 194 LBS | OXYGEN SATURATION: 96 % | HEIGHT: 72 IN

## 2025-02-26 PROCEDURE — G2211 COMPLEX E/M VISIT ADD ON: CPT

## 2025-02-26 PROCEDURE — 93000 ELECTROCARDIOGRAM COMPLETE: CPT

## 2025-02-26 PROCEDURE — 99214 OFFICE O/P EST MOD 30 MIN: CPT

## 2025-03-07 ENCOUNTER — APPOINTMENT (OUTPATIENT)
Dept: HEART AND VASCULAR | Facility: CLINIC | Age: 70
End: 2025-03-07
Payer: MEDICARE

## 2025-03-07 VITALS
SYSTOLIC BLOOD PRESSURE: 102 MMHG | OXYGEN SATURATION: 98 % | DIASTOLIC BLOOD PRESSURE: 64 MMHG | HEART RATE: 73 BPM | HEIGHT: 72 IN | BODY MASS INDEX: 25.33 KG/M2 | WEIGHT: 187 LBS

## 2025-03-07 DIAGNOSIS — I77.810 THORACIC AORTIC ECTASIA: ICD-10-CM

## 2025-03-07 PROCEDURE — 93306 TTE W/DOPPLER COMPLETE: CPT

## 2025-04-15 ENCOUNTER — TRANSCRIPTION ENCOUNTER (OUTPATIENT)
Age: 70
End: 2025-04-15

## 2025-04-29 ENCOUNTER — RX RENEWAL (OUTPATIENT)
Age: 70
End: 2025-04-29

## 2025-06-24 ENCOUNTER — RX RENEWAL (OUTPATIENT)
Age: 70
End: 2025-06-24

## 2025-07-08 ENCOUNTER — TRANSCRIPTION ENCOUNTER (OUTPATIENT)
Age: 70
End: 2025-07-08

## 2025-07-18 NOTE — PHYSICAL EXAM
normal mood with appropriate affect [Normal] : well developed, well nourished, no acute distress [Well Developed] : well developed [Well Nourished] : well nourished [No Acute Distress] : no acute distress

## 2025-08-07 ENCOUNTER — NON-APPOINTMENT (OUTPATIENT)
Age: 70
End: 2025-08-07

## 2025-08-11 ENCOUNTER — OUTPATIENT (OUTPATIENT)
Dept: OUTPATIENT SERVICES | Facility: HOSPITAL | Age: 70
LOS: 1 days | End: 2025-08-11
Payer: MEDICARE

## 2025-08-11 ENCOUNTER — APPOINTMENT (OUTPATIENT)
Dept: HEART AND VASCULAR | Facility: CLINIC | Age: 70
End: 2025-08-11
Payer: MEDICARE

## 2025-08-11 VITALS
HEART RATE: 63 BPM | WEIGHT: 198 LBS | TEMPERATURE: 97.7 F | DIASTOLIC BLOOD PRESSURE: 75 MMHG | BODY MASS INDEX: 26.82 KG/M2 | SYSTOLIC BLOOD PRESSURE: 118 MMHG | HEIGHT: 72 IN | OXYGEN SATURATION: 95 %

## 2025-08-11 DIAGNOSIS — R05.9 COUGH, UNSPECIFIED: ICD-10-CM

## 2025-08-11 PROCEDURE — 93000 ELECTROCARDIOGRAM COMPLETE: CPT

## 2025-08-11 PROCEDURE — 71046 X-RAY EXAM CHEST 2 VIEWS: CPT

## 2025-08-11 PROCEDURE — 99214 OFFICE O/P EST MOD 30 MIN: CPT

## 2025-08-11 PROCEDURE — G2211 COMPLEX E/M VISIT ADD ON: CPT

## 2025-08-11 PROCEDURE — 71046 X-RAY EXAM CHEST 2 VIEWS: CPT | Mod: 26

## 2025-08-11 PROCEDURE — 36415 COLL VENOUS BLD VENIPUNCTURE: CPT

## 2025-08-12 LAB
ALBUMIN SERPL ELPH-MCNC: 4.2 G/DL
ALBUMIN, RANDOM URINE: <1.2 MG/DL
ALP BLD-CCNC: 81 U/L
ALT SERPL-CCNC: 29 U/L
ANION GAP SERPL CALC-SCNC: 14 MMOL/L
AST SERPL-CCNC: 32 U/L
BASOPHILS # BLD AUTO: 0.03 K/UL
BASOPHILS NFR BLD AUTO: 0.5 %
BILIRUB SERPL-MCNC: 0.6 MG/DL
BUN SERPL-MCNC: 17 MG/DL
CALCIUM SERPL-MCNC: 9.3 MG/DL
CHLORIDE SERPL-SCNC: 109 MMOL/L
CHOLEST SERPL-MCNC: 120 MG/DL
CO2 SERPL-SCNC: 21 MMOL/L
CREAT SERPL-MCNC: 1.18 MG/DL
CREAT SPEC-SCNC: 96 MG/DL
EGFRCR SERPLBLD CKD-EPI 2021: 66 ML/MIN/1.73M2
EOSINOPHIL # BLD AUTO: 0.16 K/UL
EOSINOPHIL NFR BLD AUTO: 2.9 %
ESTIMATED AVERAGE GLUCOSE: 117 MG/DL
GLUCOSE SERPL-MCNC: 88 MG/DL
HBA1C MFR BLD HPLC: 5.7 %
HCT VFR BLD CALC: 41.9 %
HDLC SERPL-MCNC: 60 MG/DL
HGB BLD-MCNC: 13.9 G/DL
IMM GRANULOCYTES NFR BLD AUTO: 0.2 %
LDLC SERPL-MCNC: 51 MG/DL
LYMPHOCYTES # BLD AUTO: 1.4 K/UL
LYMPHOCYTES NFR BLD AUTO: 25.5 %
MAGNESIUM SERPL-MCNC: 2.2 MG/DL
MAN DIFF?: NORMAL
MCHC RBC-ENTMCNC: 32.9 PG
MCHC RBC-ENTMCNC: 33.2 G/DL
MCV RBC AUTO: 99.3 FL
MICROALBUMIN/CREAT 24H UR-RTO: NORMAL MG/G
MONOCYTES # BLD AUTO: 0.64 K/UL
MONOCYTES NFR BLD AUTO: 11.6 %
NEUTROPHILS # BLD AUTO: 3.26 K/UL
NEUTROPHILS NFR BLD AUTO: 59.3 %
NONHDLC SERPL-MCNC: 61 MG/DL
PLATELET # BLD AUTO: 286 K/UL
POTASSIUM SERPL-SCNC: 4.6 MMOL/L
PROT SERPL-MCNC: 6.4 G/DL
RBC # BLD: 4.22 M/UL
RBC # FLD: 14 %
SODIUM SERPL-SCNC: 144 MMOL/L
TRIGL SERPL-MCNC: 39 MG/DL
TSH SERPL-ACNC: 1.29 UIU/ML
WBC # FLD AUTO: 5.5 K/UL

## 2025-09-16 ENCOUNTER — NON-APPOINTMENT (OUTPATIENT)
Age: 70
End: 2025-09-16